# Patient Record
Sex: FEMALE | Race: WHITE | NOT HISPANIC OR LATINO | Employment: OTHER | URBAN - METROPOLITAN AREA
[De-identification: names, ages, dates, MRNs, and addresses within clinical notes are randomized per-mention and may not be internally consistent; named-entity substitution may affect disease eponyms.]

---

## 2018-01-24 ENCOUNTER — HOSPITAL ENCOUNTER (OUTPATIENT)
Dept: MAMMOGRAPHY | Facility: CLINIC | Age: 63
Discharge: HOME | End: 2018-01-24
Attending: FAMILY MEDICINE | Admitting: FAMILY MEDICINE
Payer: COMMERCIAL

## 2018-01-24 DIAGNOSIS — Z12.31 VISIT FOR SCREENING MAMMOGRAM: ICD-10-CM

## 2018-01-24 PROCEDURE — 77067 SCR MAMMO BI INCL CAD: CPT

## 2019-02-01 ENCOUNTER — HOSPITAL ENCOUNTER (OUTPATIENT)
Dept: MAMMOGRAPHY | Facility: CLINIC | Age: 64
Discharge: HOME | End: 2019-02-01
Attending: FAMILY MEDICINE | Admitting: FAMILY MEDICINE
Payer: COMMERCIAL

## 2019-02-01 DIAGNOSIS — Z12.31 VISIT FOR SCREENING MAMMOGRAM: ICD-10-CM

## 2019-02-01 PROCEDURE — 77067 SCR MAMMO BI INCL CAD: CPT

## 2020-02-05 ENCOUNTER — HOSPITAL ENCOUNTER (OUTPATIENT)
Dept: MAMMOGRAPHY | Facility: CLINIC | Age: 65
Discharge: HOME | End: 2020-02-05
Attending: FAMILY MEDICINE | Admitting: FAMILY MEDICINE
Payer: COMMERCIAL

## 2020-02-05 DIAGNOSIS — Z12.31 VISIT FOR SCREENING MAMMOGRAM: ICD-10-CM

## 2020-02-05 PROCEDURE — 77067 SCR MAMMO BI INCL CAD: CPT

## 2020-09-18 DIAGNOSIS — Z11.59 ENCOUNTER FOR SCREENING FOR OTHER VIRAL DISEASES: Primary | ICD-10-CM

## 2020-10-22 DIAGNOSIS — Z11.59 ENCOUNTER FOR SCREENING FOR OTHER VIRAL DISEASES: ICD-10-CM

## 2020-10-22 PROCEDURE — U0003 INFECTIOUS AGENT DETECTION BY NUCLEIC ACID (DNA OR RNA); SEVERE ACUTE RESPIRATORY SYNDROME CORONAVIRUS 2 (SARS-COV-2) (CORONAVIRUS DISEASE [COVID-19]), AMPLIFIED PROBE TECHNIQUE, MAKING USE OF HIGH THROUGHPUT TECHNOLOGIES AS DESCRIBED BY CMS-2020-01-R: HCPCS | Performed by: COLON & RECTAL SURGERY

## 2020-10-23 LAB
SARS-COV-2 RNA SPEC QL NAA+PROBE: NOT DETECTED
SPECIMEN SOURCE: NORMAL

## 2020-10-26 ENCOUNTER — HOSPITAL ENCOUNTER (OUTPATIENT)
Facility: CLINIC | Age: 65
Discharge: HOME | End: 2020-10-26
Attending: COLON & RECTAL SURGERY | Admitting: COLON & RECTAL SURGERY
Payer: COMMERCIAL

## 2020-10-26 VITALS
WEIGHT: 140 LBS | SYSTOLIC BLOOD PRESSURE: 133 MMHG | OXYGEN SATURATION: 99 % | HEART RATE: 61 BPM | TEMPERATURE: 98.3 F | BODY MASS INDEX: 23.9 KG/M2 | DIASTOLIC BLOOD PRESSURE: 81 MMHG | RESPIRATION RATE: 15 BRPM | HEIGHT: 64 IN

## 2020-10-26 LAB — COLONOSCOPY: NORMAL

## 2020-10-26 PROCEDURE — G0500 MOD SEDAT ENDO SERVICE >5YRS: HCPCS | Performed by: COLON & RECTAL SURGERY

## 2020-10-26 PROCEDURE — 45385 COLONOSCOPY W/LESION REMOVAL: CPT | Performed by: COLON & RECTAL SURGERY

## 2020-10-26 PROCEDURE — 272N000105 HC DEVICE CLIP QUICK: Performed by: COLON & RECTAL SURGERY

## 2020-10-26 PROCEDURE — 88305 TISSUE EXAM BY PATHOLOGIST: CPT | Mod: 26 | Performed by: PATHOLOGY

## 2020-10-26 PROCEDURE — 88305 TISSUE EXAM BY PATHOLOGIST: CPT | Mod: TC | Performed by: COLON & RECTAL SURGERY

## 2020-10-26 PROCEDURE — 45381 COLONOSCOPY SUBMUCOUS NJX: CPT | Mod: PT | Performed by: COLON & RECTAL SURGERY

## 2020-10-26 PROCEDURE — 99153 MOD SED SAME PHYS/QHP EA: CPT | Performed by: COLON & RECTAL SURGERY

## 2020-10-26 PROCEDURE — 250N000011 HC RX IP 250 OP 636: Performed by: COLON & RECTAL SURGERY

## 2020-10-26 RX ORDER — SIMVASTATIN 40 MG
40 TABLET ORAL EVERY MORNING
COMMUNITY
Start: 2020-09-09 | End: 2024-06-14

## 2020-10-26 RX ORDER — FEXOFENADINE HCL 180 MG/1
180 TABLET ORAL EVERY MORNING
COMMUNITY
End: 2023-09-15

## 2020-10-26 RX ORDER — NALOXONE HYDROCHLORIDE 0.4 MG/ML
.1-.4 INJECTION, SOLUTION INTRAMUSCULAR; INTRAVENOUS; SUBCUTANEOUS
Status: CANCELLED | OUTPATIENT
Start: 2020-10-26 | End: 2020-10-27

## 2020-10-26 RX ORDER — ONDANSETRON 2 MG/ML
4 INJECTION INTRAMUSCULAR; INTRAVENOUS
Status: DISCONTINUED | OUTPATIENT
Start: 2020-10-26 | End: 2020-10-26 | Stop reason: HOSPADM

## 2020-10-26 RX ORDER — PROCHLORPERAZINE MALEATE 5 MG
5 TABLET ORAL EVERY 6 HOURS PRN
Status: CANCELLED | OUTPATIENT
Start: 2020-10-26

## 2020-10-26 RX ORDER — ONDANSETRON 2 MG/ML
4 INJECTION INTRAMUSCULAR; INTRAVENOUS EVERY 6 HOURS PRN
Status: CANCELLED | OUTPATIENT
Start: 2020-10-26

## 2020-10-26 RX ORDER — FLUMAZENIL 0.1 MG/ML
0.2 INJECTION, SOLUTION INTRAVENOUS
Status: CANCELLED | OUTPATIENT
Start: 2020-10-26 | End: 2020-10-26

## 2020-10-26 RX ORDER — DIPHENHYDRAMINE HCL 25 MG
25 CAPSULE ORAL EVERY 4 HOURS PRN
Status: CANCELLED | OUTPATIENT
Start: 2020-10-26

## 2020-10-26 RX ORDER — DIPHENHYDRAMINE HYDROCHLORIDE 50 MG/ML
25 INJECTION INTRAMUSCULAR; INTRAVENOUS EVERY 4 HOURS PRN
Status: CANCELLED | OUTPATIENT
Start: 2020-10-26

## 2020-10-26 RX ORDER — ONDANSETRON 4 MG/1
4 TABLET, ORALLY DISINTEGRATING ORAL EVERY 6 HOURS PRN
Status: CANCELLED | OUTPATIENT
Start: 2020-10-26

## 2020-10-26 RX ORDER — FENTANYL CITRATE 50 UG/ML
INJECTION, SOLUTION INTRAMUSCULAR; INTRAVENOUS PRN
Status: DISCONTINUED | OUTPATIENT
Start: 2020-10-26 | End: 2020-10-26 | Stop reason: HOSPADM

## 2020-10-26 RX ORDER — LIDOCAINE 40 MG/G
CREAM TOPICAL
Status: DISCONTINUED | OUTPATIENT
Start: 2020-10-26 | End: 2020-10-26 | Stop reason: HOSPADM

## 2020-10-26 ASSESSMENT — MIFFLIN-ST. JEOR: SCORE: 1165.04

## 2020-10-26 NOTE — OP NOTE
See Provation Note In Chart    Mary Morse MD  [facility]  Office Phone # [phone]

## 2020-10-26 NOTE — H&P
"Pre-Endoscopy History and Physical     Layla Lazaro MRN# [id number]   YOB: 1955 Age: 65 year old     Date of Procedure: 10/26/2020  Primary care provider: Yecenia Ricks  Type of Endoscopy: Colonoscopy  Reason for Procedure: Screening  Type of Anesthesia Anticipated: Moderate Sedation    HPI:    Layla is a 65 year old female who will be undergoing the above procedure.      A history and physical has been performed. The patient's medications and allergies have been reviewed. The risks and benefits of the procedure and the sedation options and risks were discussed with the patient.  All questions were answered and informed consent was obtained.      She denies a personal or family history of anesthesia complications or bleeding disorders.     No Known Allergies     No current facility-administered medications on file prior to encounter.        Cyanocobalamin (VITAMIN B 12) 100 MCG LOZG,        fexofenadine (ALLEGRA) 60 MG tablet, Take 60 mg by mouth 2 times daily       simvastatin (ZOCOR) 40 MG tablet,         There is no problem list on file for this patient.       Past Medical History:   Diagnosis Date     Colon polyps      Hemochromatosis     gives blood every 3 mo        Past Surgical History:   Procedure Laterality Date     EYE SURGERY      as child       Social History     Tobacco Use     Smoking status: Former Smoker     Smokeless tobacco: Never Used   Substance Use Topics     Alcohol use: Yes     Comment: 5 per wk       Family History   Problem Relation Age of Onset     Colon Cancer Father        REVIEW OF SYSTEMS:     5 point ROS negative except as noted above in HPI, including Gen., Resp., CV, GI &  system review.      PHYSICAL EXAM:   /81   Pulse 73   Temp 98.3  F (36.8  C) (Oral)   Ht 1.626 m (5' 4\")   Wt 63.5 kg (140 lb)   SpO2 99%   BMI 24.03 kg/m   Estimated body mass index is 24.03 kg/m  as calculated from the following:    Height as of this encounter: 1.626 m " "(5' 4\").    Weight as of this encounter: 63.5 kg (140 lb).   GENERAL APPEARANCE: healthy and alert  MENTAL STATUS: alert  AIRWAY EXAM: Mallampatti Class I (visualization of the soft palate, fauces, uvula, anterior and posterior pillars)  RESP: lungs clear to auscultation - no rales, rhonchi or wheezes  CV: regular rates and rhythm      IMPRESSION   ASA Class 2 - Mild systemic disease        PLAN:     Plan for colonoscopy. We discussed the risks, benefits and alternatives and the patient wished to proceed.    The above has been forwarded to the consulting provider.      Mary Morse MD  [facility]  Phone: [phone]  Fax: [phone]  October 26, 2020    "

## 2020-10-27 LAB — COPATH REPORT: NORMAL

## 2021-02-16 ENCOUNTER — HOSPITAL ENCOUNTER (OUTPATIENT)
Dept: MAMMOGRAPHY | Facility: CLINIC | Age: 66
Discharge: HOME | End: 2021-02-16
Attending: FAMILY MEDICINE | Admitting: FAMILY MEDICINE
Payer: COMMERCIAL

## 2021-02-16 DIAGNOSIS — Z12.31 VISIT FOR SCREENING MAMMOGRAM: ICD-10-CM

## 2021-02-16 PROCEDURE — 77063 BREAST TOMOSYNTHESIS BI: CPT

## 2021-03-21 ENCOUNTER — HEALTH MAINTENANCE LETTER (OUTPATIENT)
Age: 66
End: 2021-03-21

## 2021-09-04 ENCOUNTER — HEALTH MAINTENANCE LETTER (OUTPATIENT)
Age: 66
End: 2021-09-04

## 2022-03-10 ENCOUNTER — HOSPITAL ENCOUNTER (OUTPATIENT)
Dept: MAMMOGRAPHY | Facility: CLINIC | Age: 67
Discharge: HOME | End: 2022-03-10
Attending: FAMILY MEDICINE | Admitting: FAMILY MEDICINE
Payer: COMMERCIAL

## 2022-03-10 DIAGNOSIS — Z12.31 VISIT FOR SCREENING MAMMOGRAM: ICD-10-CM

## 2022-03-10 PROCEDURE — 77067 SCR MAMMO BI INCL CAD: CPT

## 2022-03-24 ENCOUNTER — TRANSFERRED RECORDS (OUTPATIENT)
Dept: HEALTH INFORMATION MANAGEMENT | Facility: CLINIC | Age: 67
End: 2022-03-24

## 2022-03-24 DIAGNOSIS — R01.1 HEART MURMUR: Primary | ICD-10-CM

## 2022-03-24 LAB
ALT SERPL-CCNC: 12 U/L (ref 6–29)
AST SERPL-CCNC: 12 U/L (ref 10–35)
CHOLESTEROL (EXTERNAL): 163 MG/DL
CREATININE (EXTERNAL): 0.55 MG/DL (ref 0.5–0.99)
GFR ESTIMATED (EXTERNAL): 97 ML/MIN/1.73M2
GFR ESTIMATED (IF AFRICAN AMERICAN) (EXTERNAL): 112 ML/MIN/1.73M2
GLUCOSE (EXTERNAL): 92 MG/DL (ref 65–99)
HDLC SERPL-MCNC: 73 MG/DL
LDL CHOLESTEROL CALCULATED (EXTERNAL): 76 MG/DL
NON HDL CHOLESTEROL (EXTERNAL): 90 MG/DL
POTASSIUM (EXTERNAL): 4.2 MMOL/L (ref 3.5–5.3)
TRIGLYCERIDES (EXTERNAL): 59 MG/DL
TSH SERPL-ACNC: 2.34 MIU/L (ref 0.4–4.5)

## 2022-04-06 ENCOUNTER — TRANSFERRED RECORDS (OUTPATIENT)
Dept: HEALTH INFORMATION MANAGEMENT | Facility: CLINIC | Age: 67
End: 2022-04-06

## 2022-04-16 ENCOUNTER — HEALTH MAINTENANCE LETTER (OUTPATIENT)
Age: 67
End: 2022-04-16

## 2022-05-23 ENCOUNTER — HOSPITAL ENCOUNTER (OUTPATIENT)
Dept: CARDIOLOGY | Facility: CLINIC | Age: 67
Discharge: HOME | End: 2022-05-23
Attending: FAMILY MEDICINE | Admitting: FAMILY MEDICINE
Payer: COMMERCIAL

## 2022-05-23 DIAGNOSIS — R01.1 HEART MURMUR: ICD-10-CM

## 2022-05-23 LAB — LVEF ECHO: NORMAL

## 2022-05-23 PROCEDURE — 93306 TTE W/DOPPLER COMPLETE: CPT

## 2022-05-23 PROCEDURE — 93306 TTE W/DOPPLER COMPLETE: CPT | Mod: 26 | Performed by: INTERNAL MEDICINE

## 2022-07-12 ENCOUNTER — TRANSFERRED RECORDS (OUTPATIENT)
Dept: HEALTH INFORMATION MANAGEMENT | Facility: CLINIC | Age: 67
End: 2022-07-12

## 2022-10-22 ENCOUNTER — HEALTH MAINTENANCE LETTER (OUTPATIENT)
Age: 67
End: 2022-10-22

## 2022-11-17 ENCOUNTER — TELEPHONE (OUTPATIENT)
Dept: ORTHOPEDICS | Facility: CLINIC | Age: 67
End: 2022-11-17

## 2022-11-17 NOTE — TELEPHONE ENCOUNTER
Medical Records received from [facility].   Patient is scheduled for office visit on 12/9/22 with Dr. Torrez.     Medical records placed in provider bin for resource at upcoming appointment.   Records will be sent to scan once visit is complete.     Sherlyn Tatum, ATC

## 2022-11-29 ENCOUNTER — TRANSFERRED RECORDS (OUTPATIENT)
Dept: HEALTH INFORMATION MANAGEMENT | Facility: CLINIC | Age: 67
End: 2022-11-29

## 2022-12-03 ASSESSMENT — KOOS JR
STANDING UPRIGHT: MODERATE
RISING FROM SITTING: SEVERE
KOOS JR SCORING: 36.93
GOING UP OR DOWN STAIRS: SEVERE
HOW SEVERE IS YOUR KNEE STIFFNESS AFTER FIRST WAKING IN MORNING: EXTREME
BENDING TO THE FLOOR TO PICK UP OBJECT: MILD
TWISING OR PIVOTING ON KNEE: EXTREME
STRAIGHTENING KNEE FULLY: SEVERE

## 2022-12-09 ENCOUNTER — ANCILLARY PROCEDURE (OUTPATIENT)
Dept: GENERAL RADIOLOGY | Facility: CLINIC | Age: 67
End: 2022-12-09
Attending: STUDENT IN AN ORGANIZED HEALTH CARE EDUCATION/TRAINING PROGRAM
Payer: COMMERCIAL

## 2022-12-09 ENCOUNTER — OFFICE VISIT (OUTPATIENT)
Dept: ORTHOPEDICS | Facility: CLINIC | Age: 67
End: 2022-12-09
Payer: COMMERCIAL

## 2022-12-09 VITALS
BODY MASS INDEX: 26.05 KG/M2 | HEIGHT: 64 IN | DIASTOLIC BLOOD PRESSURE: 76 MMHG | WEIGHT: 152.6 LBS | SYSTOLIC BLOOD PRESSURE: 114 MMHG

## 2022-12-09 DIAGNOSIS — G89.29 CHRONIC PAIN OF BOTH KNEES: Primary | ICD-10-CM

## 2022-12-09 DIAGNOSIS — M25.562 CHRONIC PAIN OF BOTH KNEES: Primary | ICD-10-CM

## 2022-12-09 DIAGNOSIS — M25.561 BILATERAL KNEE PAIN: ICD-10-CM

## 2022-12-09 DIAGNOSIS — M25.562 BILATERAL KNEE PAIN: ICD-10-CM

## 2022-12-09 DIAGNOSIS — M17.11 OSTEOARTHROSIS, LOCALIZED, PRIMARY, KNEE, RIGHT: ICD-10-CM

## 2022-12-09 DIAGNOSIS — M25.561 CHRONIC PAIN OF BOTH KNEES: Primary | ICD-10-CM

## 2022-12-09 PROCEDURE — 99204 OFFICE O/P NEW MOD 45 MIN: CPT | Performed by: STUDENT IN AN ORGANIZED HEALTH CARE EDUCATION/TRAINING PROGRAM

## 2022-12-09 PROCEDURE — 73562 X-RAY EXAM OF KNEE 3: CPT | Mod: TC | Performed by: RADIOLOGY

## 2022-12-09 PROCEDURE — 73562 X-RAY EXAM OF KNEE 3: CPT | Mod: TC | Performed by: STUDENT IN AN ORGANIZED HEALTH CARE EDUCATION/TRAINING PROGRAM

## 2022-12-09 PROCEDURE — 77073 BONE LENGTH STUDIES: CPT | Mod: TC | Performed by: RADIOLOGY

## 2022-12-09 RX ORDER — TRANEXAMIC ACID 650 MG/1
1950 TABLET ORAL ONCE
Status: CANCELLED | OUTPATIENT
Start: 2022-12-09 | End: 2022-12-09

## 2022-12-09 NOTE — PROGRESS NOTES
"    [facility]  Orthopaedic Surgery Consultation by Jose Juan Torrez M.D.    Layla Lazaro MRN# [id number]   Age: 67 year old YOB: 1955     Requesting physician: Yecenia Marquez     Background history:  DX:  1. Hemochromatosis    TREATMENTS:  1. None           History of Present Illness:   67 year old female who presents her clinic because of chronic right knee pain.  This pain has been present for many years.  No clear antecedent trauma but patient did suffer from a fall approximately 7 years ago.  Pain has been progressive since.  Is felt mainly on the medial side of the knee.  The knee swells.  She describes the presence of crepitus.  No giving way or instability.  Patient endorsed the presence of night pain, initiation stiffness and soreness.  Pain is worse when walking up and down stairs.  She denies the presence of significant lower back or ipsilateral hip/groin pain.  To mitigate the pain patient has tried over-the-counter analgesics, physical therapy and home exercise regimen.  She has tried multiple injections with both cortisone and hyaluronic acid.  The latest injection on 11/29/2022 only provided limited relief for a few days.  Patient has not tried a medial  brace yet.  Patient would like to discuss knee replacement surgery as her current complaints are greatly limiting her quality of life and activity level.    Social:   Occupation: retired  Living situation: lives with spouse  Hobbies / Sports: walking    Smoking: No  Alcohol: Yes  Illicit drug use: No         Physical Exam:     EXAMINATION pertinent findings:   PSYCH: Pleasant, healthy-appearing, alert, oriented x3, cooperative. Normal mood and affect.  VITAL SIGNS: Height 1.626 m (5' 4\"), weight 69.2 kg (152 lb 9.6 oz).  Reviewed nursing intake notes.   Body mass index is 26.19 kg/m .  RESP: non labored breathing   ABD: benign, soft, non-tender, no acute peritoneal findings  SKIN: grossly normal "   LYMPHATIC: grossly normal, no adenopathy, no extremity edema  NEURO: grossly normal , no motor deficits  VASCULAR: satisfactory perfusion of all extremities   MUSCULOSKELETAL:   Alignment: Varus alignment  Gait: Normal gait.    The right hip exhibits a full range of motion.  No pain upon rotations.  Lasegue's test is negative.  No tenderness to palpation over greater trochanteric region.    R knee: -0-0  .  Deep flexion is recognizably painful.  Straight leg raise +. No redness, warmth or skin changes present. Effusion Some. Ligamentously stable in both ML and AP direction.  +1 laxity in ML direction most likely due to substance loss.  Normal PF tracking without crepitus. Rabot -. Apprehension -. Meniscal provocation tests are sensitive.  There is recognizable tenderness to palpation over the medial joint line.     Right LE:   Thigh and leg compartments soft and compressible   +Quad/TA/GSC/FHL/EHL   SILT DP/SP/Sarah/Saph/Tib nerve distributions   Palpable dorsalis pedis pulse          Data:   All laboratory data reviewed  All imaging studies reviewed by me personally.    XR knee right 12/9/2022:  My interpretation: End-stage osteoarthritic change of the medial compartment with complete obliteration of joint space.  Presence of marginal osteophytes, sclerosis and subchondral cysts.  Well-preserved lateral and patellofemoral compartments.         Assessment and Plan:   Assessment:  67-year-old female presenting with chronic right knee pain due to end-stage osteoarthritic changes most notably in medial compartment.  Insufficiently responding to nonsurgical treatment options     Plan:  I had a long discussion with the patient regarding ongoing management options.  Reviewed surgical and nonsurgical treatments.  The non-surgical options include activity modification, pain medication, PT, bracing and injection therapy.  We discussed that to optimize nonsurgical treatment we could attempt a medial  brace.   However, I do not anticipate any significant or long-term relief provided by this.  As for surgery we discussed the options of partial and total knee replacement surgery. We reviewed partial and total knee replacement in detail including the procedure, the implants, the recovery process, and long-term outcomes.  We reviewed that the risks of the surgery include but are not limited to infection, wound problems, stiffness, persistent pain, swelling, clicking, loosening, revision surgery.  We also reviewed less common risks such as neurovascular injury fracture, and other implant-related issues.  We reviewed other medical complications such as a blood clot.  We discussed that the vast majority of cases have a highly successful outcome.  However there is a small subset of patients that do experience complications or problems following the knee replacement and these problems can be very debilitating and painful and sometimes do not improve.   Based on a discussion of the risks and benefits, we believe that the benefits far outweigh the risks at this point and the patient  would like to proceed with partial medial unicondylar knee replacement surgery versus possible total knee replacement surgery.  We will work on scheduling surgery at a time that works well for patient in the next few months taking into account that this cannot be done before March 2023 because of the recent injection..  Patient will contact us if there are any questions or concerns leading up to surgery. Before surgery the patient will attend a joint replacement class and will be seen by the PCP/ anesthesiologist and dentist.    Patient is a candidate for same-day discharge surgery.     More information on joint replacements can be found on : [email or web address]    Thank you for your referral.      Jose Juan Torrez MD, PhD     Adult Reconstruction  [facility] Department of  Orthopaedic Surgery        DATA for DOCUMENTATION:         Past Medical History:   There is no problem list on file for this patient.    Past Medical History:   Diagnosis Date     Colon polyps      Hemochromatosis     gives blood every 3 mo       Also see scanned health assessment forms.       Past Surgical History:     Past Surgical History:   Procedure Laterality Date     EYE SURGERY      as child            Social History:     Social History     Socioeconomic History     Marital status:      Spouse name: Not on file     Number of children: Not on file     Years of education: Not on file     Highest education level: Not on file   Occupational History     Not on file   Tobacco Use     Smoking status: Former     Smokeless tobacco: Never   Substance and Sexual Activity     Alcohol use: Yes     Comment: 5 per wk     Drug use: Never     Sexual activity: Not on file   Other Topics Concern     Parent/sibling w/ CABG, MI or angioplasty before 65F 55M? Not Asked   Social History Narrative     Not on file     Social Determinants of Health     Financial Resource Strain: Not on file   Food Insecurity: Not on file   Transportation Needs: Not on file   Physical Activity: Not on file   Stress: Not on file   Social Connections: Not on file   Intimate Partner Violence: Not on file   Housing Stability: Not on file            Family History:       Family History   Problem Relation Age of Onset     Colon Cancer Father             Medications:     Current Outpatient Medications   Medication Sig     Cyanocobalamin (VITAMIN B 12) 100 MCG LOZG      fexofenadine (ALLEGRA) 60 MG tablet Take 60 mg by mouth 2 times daily     simvastatin (ZOCOR) 40 MG tablet      No current facility-administered medications for this visit.              Review of Systems:   A comprehensive 10 point review of systems (constitutional, ENT, cardiac, peripheral vascular, lymphatic, respiratory, GI, , Musculoskeletal, skin, Neurological) was performed and  found to be negative except as described in this note.     See intake form completed by patient

## 2022-12-09 NOTE — PATIENT INSTRUCTIONS
1. Chronic pain of both knees    2. Osteoarthrosis, localized, primary, knee, right        -Schedule right uni vs total knee arthroplasty surgery  Sharmila Ontiveros Scheduler will contact you to assist with scheduling surgery.   You can contact her directly at [phone].   Prior to your scheduled surgery we advise scheduling with your dentist to obtain clearance for surgery, and to complete any recommended dental work prior.   Your surgery date will need to be at least 3 months from your last cortisone injection on 11/29/22.    For mor information regarding total joint surgery please visit our website:   [email or web address]    FORMS:   If you are needing any forms completed relating to your upcoming procedure, please send them to our office with a completed Release of Information.   Forms will be completed AFTER your procedure. A letter can be sent to your employer prior to surgery to inform them of your anticipated time off.    Please notify our staff if you would like a letter to do so.   Forms can be faxed directly to our clinic at [phone].     DO NOT BRING FORMS ON THE DATE OF SURGERY.     MEDICATION REFILL:   Please allow 3 business days for completion of medication refills for any surgery related prescription.   Medication refills submitted on Friday, may not be addressed until the following Monday.  You may request a refill via [employer], or by calling our Nurse Triage at [phone], option 3.       Call my office with any questions or concerns, [phone].

## 2022-12-09 NOTE — LETTER
12/9/2022         RE: Layla Lazaro  [address] [address]        Dear Colleague,    Thank you for referring your patient, Layla Lazaro, to the [facility] [place]. Please see a copy of my visit note below.        [facility]  Orthopaedic Surgery Consultation by Jose Juan Torrez M.D.    Layla Lazaro MRN# [id number]   Age: 67 year old YOB: 1955     Requesting physician: Yecenia Marquez     Background history:  DX:  1. Hemochromatosis    TREATMENTS:  1. None           History of Present Illness:   67 year old female who presents her clinic because of chronic right knee pain.  This pain has been present for many years.  No clear antecedent trauma but patient did suffer from a fall approximately 7 years ago.  Pain has been progressive since.  Is felt mainly on the medial side of the knee.  The knee swells.  She describes the presence of crepitus.  No giving way or instability.  Patient endorsed the presence of night pain, initiation stiffness and soreness.  Pain is worse when walking up and down stairs.  She denies the presence of significant lower back or ipsilateral hip/groin pain.  To mitigate the pain patient has tried over-the-counter analgesics, physical therapy and home exercise regimen.  She has tried multiple injections with both cortisone and hyaluronic acid.  The latest injection on 11/29/2022 only provided limited relief for a few days.  Patient has not tried a medial  brace yet.  Patient would like to discuss knee replacement surgery as her current complaints are greatly limiting her quality of life and activity level.    Social:   Occupation: retired  Living situation: lives with spouse  Hobbies / Sports: walking    Smoking: No  Alcohol: Yes  Illicit drug use: No         Physical Exam:     EXAMINATION pertinent findings:   PSYCH: Pleasant, healthy-appearing, alert, oriented x3,  "cooperative. Normal mood and affect.  VITAL SIGNS: Height 1.626 m (5' 4\"), weight 69.2 kg (152 lb 9.6 oz).  Reviewed nursing intake notes.   Body mass index is 26.19 kg/m .  RESP: non labored breathing   ABD: benign, soft, non-tender, no acute peritoneal findings  SKIN: grossly normal   LYMPHATIC: grossly normal, no adenopathy, no extremity edema  NEURO: grossly normal , no motor deficits  VASCULAR: satisfactory perfusion of all extremities   MUSCULOSKELETAL:   Alignment: Varus alignment  Gait: Normal gait.    The right hip exhibits a full range of motion.  No pain upon rotations.  Lasegue's test is negative.  No tenderness to palpation over greater trochanteric region.    R knee: -0-0  .  Deep flexion is recognizably painful.  Straight leg raise +. No redness, warmth or skin changes present. Effusion Some. Ligamentously stable in both ML and AP direction.  +1 laxity in ML direction most likely due to substance loss.  Normal PF tracking without crepitus. Rabot -. Apprehension -. Meniscal provocation tests are sensitive.  There is recognizable tenderness to palpation over the medial joint line.     Right LE:   Thigh and leg compartments soft and compressible   +Quad/TA/GSC/FHL/EHL   SILT DP/SP/Sarah/Saph/Tib nerve distributions   Palpable dorsalis pedis pulse          Data:   All laboratory data reviewed  All imaging studies reviewed by me personally.    XR knee right 12/9/2022:  My interpretation: End-stage osteoarthritic change of the medial compartment with complete obliteration of joint space.  Presence of marginal osteophytes, sclerosis and subchondral cysts.  Well-preserved lateral and patellofemoral compartments.         Assessment and Plan:   Assessment:  67-year-old female presenting with chronic right knee pain due to end-stage osteoarthritic changes most notably in medial compartment.  Insufficiently responding to nonsurgical treatment options     Plan:  I had a long discussion with the patient " regarding ongoing management options.  Reviewed surgical and nonsurgical treatments.  The non-surgical options include activity modification, pain medication, PT, bracing and injection therapy.  We discussed that to optimize nonsurgical treatment we could attempt a medial  brace.  However, I do not anticipate any significant or long-term relief provided by this.  As for surgery we discussed the options of partial and total knee replacement surgery. We reviewed partial and total knee replacement in detail including the procedure, the implants, the recovery process, and long-term outcomes.  We reviewed that the risks of the surgery include but are not limited to infection, wound problems, stiffness, persistent pain, swelling, clicking, loosening, revision surgery.  We also reviewed less common risks such as neurovascular injury fracture, and other implant-related issues.  We reviewed other medical complications such as a blood clot.  We discussed that the vast majority of cases have a highly successful outcome.  However there is a small subset of patients that do experience complications or problems following the knee replacement and these problems can be very debilitating and painful and sometimes do not improve.   Based on a discussion of the risks and benefits, we believe that the benefits far outweigh the risks at this point and the patient  would like to proceed with partial medial unicondylar knee replacement surgery versus possible total knee replacement surgery.  We will work on scheduling surgery at a time that works well for patient in the next few months taking into account that this cannot be done before March 2023 because of the recent injection..  Patient will contact us if there are any questions or concerns leading up to surgery. Before surgery the patient will attend a joint replacement class and will be seen by the PCP/ anesthesiologist and dentist.    Patient is a candidate for same-day  discharge surgery.     More information on joint replacements can be found on : [email or web address]    Thank you for your referral.      Jose Juan Torrez MD, PhD     Adult Reconstruction  [facility] Department of Orthopaedic Surgery        DATA for DOCUMENTATION:         Past Medical History:   There is no problem list on file for this patient.    Past Medical History:   Diagnosis Date     Colon polyps      Hemochromatosis     gives blood every 3 mo       Also see scanned health assessment forms.       Past Surgical History:     Past Surgical History:   Procedure Laterality Date     EYE SURGERY      as child            Social History:     Social History     Socioeconomic History     Marital status:      Spouse name: Not on file     Number of children: Not on file     Years of education: Not on file     Highest education level: Not on file   Occupational History     Not on file   Tobacco Use     Smoking status: Former     Smokeless tobacco: Never   Substance and Sexual Activity     Alcohol use: Yes     Comment: 5 per wk     Drug use: Never     Sexual activity: Not on file   Other Topics Concern     Parent/sibling w/ CABG, MI or angioplasty before 65F 55M? Not Asked   Social History Narrative     Not on file     Social Determinants of Health     Financial Resource Strain: Not on file   Food Insecurity: Not on file   Transportation Needs: Not on file   Physical Activity: Not on file   Stress: Not on file   Social Connections: Not on file   Intimate Partner Violence: Not on file   Housing Stability: Not on file            Family History:       Family History   Problem Relation Age of Onset     Colon Cancer Father             Medications:     Current Outpatient Medications   Medication Sig     Cyanocobalamin (VITAMIN B 12) 100 MCG LOZG      fexofenadine (ALLEGRA) 60 MG tablet Take 60 mg by mouth 2 times daily     simvastatin (ZOCOR)  40 MG tablet      No current facility-administered medications for this visit.              Review of Systems:   A comprehensive 10 point review of systems (constitutional, ENT, cardiac, peripheral vascular, lymphatic, respiratory, GI, , Musculoskeletal, skin, Neurological) was performed and found to be negative except as described in this note.     See intake form completed by patient        Again, thank you for allowing me to participate in the care of your patient.        Sincerely,        Jose Juan Torrez MD

## 2022-12-13 ENCOUNTER — TELEPHONE (OUTPATIENT)
Dept: ORTHOPEDICS | Facility: CLINIC | Age: 67
End: 2022-12-13

## 2022-12-13 DIAGNOSIS — M17.11 OSTEOARTHROSIS, LOCALIZED, PRIMARY, KNEE, RIGHT: Primary | ICD-10-CM

## 2022-12-13 NOTE — TELEPHONE ENCOUNTER
Scheduled surgery    ATC: please send PT orders to [facility]  ph: [phone]  fax: [phone]    ATC/Dr Torrze: Patient donates blood every three months due to hemochromatosis.  Patients wants to know if it's safe to donate blood 3 weeks after surgery?      Type of surgery: fast track right TKA  :   Location of surgery: [facility] OR  Date and time of surgery: 3/14/23  Surgeon: Lore  Pre-Op Appt Date: 2/27/23 with PAC  Post-Op Appt Date: 3/24/23   Packet sent out: Yes  Pre-cert/Authorization completed:  No  Date: 12/13/22      Rama Beavers Surgery Scheduler

## 2022-12-14 NOTE — TELEPHONE ENCOUNTER
Per Dr Torrez, it is safe to donate blood 3 weeks post surgery.  Notified patient.        Rama Beavers, Surgery Scheduler

## 2022-12-14 NOTE — TELEPHONE ENCOUNTER
PT order placed and faxed to [facility] at number provided below.   Closing encounter.     Linda Spain MSA, ATC  Certified Athletic Trainer

## 2022-12-22 NOTE — TELEPHONE ENCOUNTER
FUTURE VISIT INFORMATION      SURGERY INFORMATION:    Date: 3/14/23    Location:  or    Surgeon:  Jose Juan Torrez MD    Anesthesia Type:  choice    Procedure: RIGHT UNICOMPARTMENTAL KNEE ARTHROPLASTY Versus right total knee arthroplasty    Consult: ov 22    RECORDS REQUESTED FROM:       Primary Care Provider: Yecenia Ricks MD    Most recent EKG+ Tracin/28/10    Most recent ECHO: 22

## 2023-02-13 ENCOUNTER — TELEPHONE (OUTPATIENT)
Dept: ORTHOPEDICS | Facility: CLINIC | Age: 68
End: 2023-02-13
Payer: COMMERCIAL

## 2023-02-13 NOTE — TELEPHONE ENCOUNTER
Patient of Dr. Torrez.  Scheduled for right uni knee vs TKA for Tuesday 3/14/23 at [facility].    Patient is requesting a handicap parking placard.  Please advise.     Patient can be reached at [phone].      Rama Beavers, Surgery Scheduler

## 2023-02-14 NOTE — TELEPHONE ENCOUNTER
Per Dr. Torrez, OK to complete handicap parking request x2 months.   Provider portion of form completed.   Writer called and left a voicemail notifying her that the request is complete, OK to stop by clinic to , or return call and form can be placed in out-going mail.    Sherlyn Tatum, ATC

## 2023-02-17 RX ORDER — CHOLECALCIFEROL (VITAMIN D3) 25 MCG
CAPSULE ORAL
COMMUNITY
End: 2023-02-17

## 2023-02-17 RX ORDER — CHOLECALCIFEROL (VITAMIN D3) 1250 MCG
1250 CAPSULE ORAL
COMMUNITY
End: 2023-02-17

## 2023-02-17 NOTE — PROVIDER NOTIFICATION
02/17/23 0906   Living Arrangements   Is your private residence a single family home or apartment? Single family home   Number of Stairs, Within Home, Primary greater than 10 stairs   Stair Railings, Within Home, Primary railings safe and in good condition   Once home, are you able to live on one level? Yes   Which level? Main Level   Bathroom Shower/Tub Tub/Shower unit   Support System   Do you have someone available to stay with you one or two nights once you are home? Yes   Medical Clearance   It is recommended that you call and check with any specialty providers before surgery to see if you need surgical clearance.  Do you see any specialty providers outside of your primary care provider? No   Blood   Known Bleeding Disorder or Coagulopathy No   Does the patient have any Oriental orthodox/cultural preferences related to blood products? No   Education   Has the patient scheduled or completed pre-op total joint education, either in class or online, in the last 12 months? Yes   What day did the patient complete, or plan to complete, pre-op total joint education? 02/17/23   Patient attended total joint pre-op class/received pre-op teaching  online

## 2023-02-27 ENCOUNTER — PRE VISIT (OUTPATIENT)
Dept: SURGERY | Facility: CLINIC | Age: 68
End: 2023-02-27

## 2023-02-27 ENCOUNTER — ANESTHESIA EVENT (OUTPATIENT)
Dept: SURGERY | Facility: CLINIC | Age: 68
End: 2023-02-27

## 2023-02-27 ENCOUNTER — OFFICE VISIT (OUTPATIENT)
Dept: SURGERY | Facility: CLINIC | Age: 68
End: 2023-02-27
Payer: COMMERCIAL

## 2023-02-27 ENCOUNTER — LAB (OUTPATIENT)
Dept: LAB | Facility: CLINIC | Age: 68
End: 2023-02-27
Payer: COMMERCIAL

## 2023-02-27 VITALS
TEMPERATURE: 98 F | RESPIRATION RATE: 16 BRPM | OXYGEN SATURATION: 95 % | WEIGHT: 152.9 LBS | HEIGHT: 64 IN | HEART RATE: 65 BPM | DIASTOLIC BLOOD PRESSURE: 84 MMHG | BODY MASS INDEX: 26.1 KG/M2 | SYSTOLIC BLOOD PRESSURE: 128 MMHG

## 2023-02-27 DIAGNOSIS — Z01.818 PRE-OP EVALUATION: Primary | ICD-10-CM

## 2023-02-27 DIAGNOSIS — Z01.818 PRE-OP EVALUATION: ICD-10-CM

## 2023-02-27 LAB
ANION GAP SERPL CALCULATED.3IONS-SCNC: 10 MMOL/L (ref 7–15)
BUN SERPL-MCNC: 16.7 MG/DL (ref 8–23)
CALCIUM SERPL-MCNC: 9.7 MG/DL (ref 8.8–10.2)
CHLORIDE SERPL-SCNC: 104 MMOL/L (ref 98–107)
CREAT SERPL-MCNC: 0.57 MG/DL (ref 0.51–0.95)
DEPRECATED HCO3 PLAS-SCNC: 25 MMOL/L (ref 22–29)
ERYTHROCYTE [DISTWIDTH] IN BLOOD BY AUTOMATED COUNT: 12 % (ref 10–15)
GFR SERPL CREATININE-BSD FRML MDRD: >90 ML/MIN/1.73M2
GLUCOSE SERPL-MCNC: 89 MG/DL (ref 70–99)
HCT VFR BLD AUTO: 45.6 % (ref 35–47)
HGB BLD-MCNC: 15.5 G/DL (ref 11.7–15.7)
MCH RBC QN AUTO: 31.9 PG (ref 26.5–33)
MCHC RBC AUTO-ENTMCNC: 34 G/DL (ref 31.5–36.5)
MCV RBC AUTO: 94 FL (ref 78–100)
PLATELET # BLD AUTO: 260 10E3/UL (ref 150–450)
POTASSIUM SERPL-SCNC: 3.8 MMOL/L (ref 3.4–5.3)
RBC # BLD AUTO: 4.86 10E6/UL (ref 3.8–5.2)
SODIUM SERPL-SCNC: 139 MMOL/L (ref 136–145)
WBC # BLD AUTO: 7.6 10E3/UL (ref 4–11)

## 2023-02-27 PROCEDURE — 99204 OFFICE O/P NEW MOD 45 MIN: CPT | Performed by: NURSE PRACTITIONER

## 2023-02-27 PROCEDURE — 85027 COMPLETE CBC AUTOMATED: CPT | Performed by: PATHOLOGY

## 2023-02-27 PROCEDURE — 80048 BASIC METABOLIC PNL TOTAL CA: CPT | Performed by: PATHOLOGY

## 2023-02-27 PROCEDURE — 36415 COLL VENOUS BLD VENIPUNCTURE: CPT | Performed by: PATHOLOGY

## 2023-02-27 RX ORDER — CLOBETASOL PROPIONATE 0.5 MG/G
OINTMENT TOPICAL PRN
COMMUNITY

## 2023-02-27 RX ORDER — VITAMIN B COMPLEX
25 TABLET ORAL EVERY MORNING
COMMUNITY

## 2023-02-27 ASSESSMENT — ENCOUNTER SYMPTOMS: ORTHOPNEA: 0

## 2023-02-27 ASSESSMENT — PAIN SCALES - GENERAL: PAINLEVEL: NO PAIN (0)

## 2023-02-27 ASSESSMENT — LIFESTYLE VARIABLES: TOBACCO_USE: 1

## 2023-02-27 NOTE — PATIENT INSTRUCTIONS
Name:  Layla Lazaro   MRN:  [id number]   :  1955   Today's Date:  2023         You were seen today for a pre-operative assessment in the:    Pre-operative Anesthesia Assessment Center(PAC)  [facility]  [address] [place] [id number]  phone [phone]      You will be receiving a call with location, date, arrival time and diet instructions from Preadmission Nursing at your surgical site:    -[facility]: [phone]   -[facility]: [phone]  -[facility]: [phone]  -[facility]: [phone]  -[facility]: [phone]        Anesthesia recommendations for medications:    Hold Aspirin for 7 days before procedure.  Hold Multivitamins for 7 days before procedure.   Hold Herbal medications and Supplements for 7 days before procedure.  Hold Ibuprofen for 1 day before procedure.   Hold Naproxen for 4 days before procedure.       Please DO NOT take the following medications the day of procedure:    Vitamin B12  Vitamin D3    Please take these medications the day of procedure:    Allegra if needed  Simvastatin    For questions or appointments, call:    For further questions regarding your surgery please call your surgeon's office.

## 2023-02-27 NOTE — H&P
Pre-Operative H & P     CC:  Preoperative exam to assess for increased cardiopulmonary risk while undergoing surgery and anesthesia.    Date of Encounter: 2/27/2023  Primary Care Physician:  Yecenia Ricks     Reason for visit: Pre-operative evaluation    HPI  Layla Lazaro is a 68 year old female who presents for pre-operative H & P in preparation for  Procedure Information     Date/Time: 3/14/2023     Procedure: Right unicompartmental knee arthroplasty; versus right total knee arthroplasty    Anesthesia type: Choice    Pre-op diagnosis: Osteoarthritis, localized, primary, knee, right    Location: [facility]    Providers: Jose Juan Torrez MD          Layla Lazaro is a 68 year old female with a PMH significant for Moderate AS, HL, hemochromatosis (donates blood every 3 months) allergic rhinitis and OA. She met with Dr. Torrez regarding her chronic right knee pain that has been present for many years.  Pain has been progressive since.  Her symptoms include knee swelling, crepitus, night pain, stiffness and soreness. Pain is worse with walking up and down stairs. She has tried conservative pain management measure that included over-the-counter analgesics, physical therapy, and home exercise regimen.  She has tried multiple injections with both cortisone and hyaluronic acid.  The latest injection on 11/29/2022 only provided limited relief for a few days. Patient at this point feels that this is limiting her ability to do certain activities and affecting her quality of life. She presents today in preparation for the above recommended procedure.      History is obtained from the patient and chart review    Hx of abnormal bleeding or anti-platelet use: no    Menstrual history: No LMP recorded (lmp unknown). Patient is postmenopausal.:      Past Medical History  Past Medical History:   Diagnosis Date     Colon polyps      Heart murmur      Hemochromatosis     gives blood every 3 mo       Past  Surgical History  Past Surgical History:   Procedure Laterality Date     COLONOSCOPY       EYE SURGERY      as child     MOUTH SURGERY      wisdom teeth age 17       Prior to Admission Medications  Current Outpatient Medications   Medication Sig Dispense Refill     clobetasol (TEMOVATE) 0.05 % external ointment Apply topically as needed       Cyanocobalamin (VITAMIN B 12) 100 MCG LOZG Take 100 mcg by mouth every morning       fexofenadine (ALLEGRA) 180 MG tablet Take 180 mg by mouth every morning       simvastatin (ZOCOR) 40 MG tablet Take 40 mg by mouth every morning       Vitamin D3 (CHOLECALCIFEROL) 25 mcg (1000 units) tablet Take 25 mcg by mouth every morning       vitamin D3 (CHOLECALCIFEROL) 250 mcg (54017 units) capsule Take 4 capsules by mouth every 7 days         Allergies  No Known Allergies    Social History  Social History     Socioeconomic History     Marital status:      Spouse name: Not on file     Number of children: Not on file     Years of education: Not on file     Highest education level: Not on file   Occupational History     Not on file   Tobacco Use     Smoking status: Former     Types: Cigarettes     Quit date:      Years since quittin.     Smokeless tobacco: Never   Substance and Sexual Activity     Alcohol use: Not Currently     Drug use: Never     Sexual activity: Not on file   Other Topics Concern     Parent/sibling w/ CABG, MI or angioplasty before 65F 55M? Not Asked   Social History Narrative     Not on file     Social Determinants of Health     Financial Resource Strain: Not on file   Food Insecurity: Not on file   Transportation Needs: Not on file   Physical Activity: Not on file   Stress: Not on file   Social Connections: Not on file   Intimate Partner Violence: Not on file   Housing Stability: Not on file       Family History  Family History   Problem Relation Age of Onset     Colon Cancer Father        Review of Systems  The complete review of systems is negative  "other than noted in the HPI or here.   Anesthesia Evaluation   Pt has not had prior anesthetic         ROS/MED HX  ENT/Pulmonary:     (+) allergic rhinitis, tobacco use, Past use,     Neurologic:  - neg neurologic ROS     Cardiovascular:     (+) Dyslipidemia -----valvular problems/murmurs type: AS Moderate Mean gradient 20 BOO 1.4cm. Previous cardiac testing   Echo: Date: 5/2022 Results:    Stress Test: Date: Results:    ECG Reviewed: Date: Results:    Cath: Date: Results:   (-) ATWOOD, orthopnea/PND and syncope   METS/Exercise Tolerance: >4 METS Comment: Previously able to walk 3-5 miles due to knee is walking 2 miles daily.    Hematologic: Comments: Hemochromatosis- gives blood Q3 months      Musculoskeletal:   (+) arthritis,     GI/Hepatic:  - neg GI/hepatic ROS     Renal/Genitourinary:  - neg Renal ROS     Endo:  - neg endo ROS     Psychiatric/Substance Use:  - neg psychiatric ROS     Infectious Disease:  - neg infectious disease ROS     Malignancy:  - neg malignancy ROS     Other:            /84 (BP Location: Right arm, Patient Position: Sitting, Cuff Size: Adult Regular)   Pulse 65   Temp 98  F (36.7  C) (Oral)   Resp 16   Ht 1.626 m (5' 4\")   Wt 69.4 kg (152 lb 14.4 oz)   LMP  (LMP Unknown)   SpO2 95%   Breastfeeding No   BMI 26.25 kg/m      Physical Exam   Constitutional: Awake, alert, cooperative, no apparent distress, and appears stated age.  Eyes: Pupils equal, round and reactive to light, extra ocular muscles intact, sclera clear, conjunctiva normal.  HENT: Normocephalic, oral pharynx with moist mucus membranes, good dentition. No goiter appreciated.   Respiratory: Clear to auscultation bilaterally, no crackles or wheezing.  Cardiovascular: Regular rate and rhythm, normal S1 and S2, and 2/6 murmur noted.  Carotids +2, no bruits. No edema. Palpable pulses to radial  DP and PT arteries.   GI: Normal bowel sounds, soft, non-distended, non-tender, no masses palpated, no hepatosplenomegaly.  "   Lymph/Hematologic: No cervical lymphadenopathy and no supraclavicular lymphadenopathy.  Genitourinary:  deferred  Skin: Warm and dry.  No rashes at anticipated surgical site.   Musculoskeletal: Full ROM of neck. There is no redness, warmth, or swelling of the joints. Gross motor strength is normal.    Neurologic: Awake, alert, oriented to name, place and time. Cranial nerves II-XII are grossly intact. Gait is normal.   Neuropsychiatric: Calm, cooperative. Normal affect.     Prior Labs/Diagnostic Studies   All labs and imaging personally reviewed     EKG/ stress test - if available please see in ROS above   5/2022  Echo result w/o MOPS: Interpretation Summary Left ventricular size, global systolic function, and wall motion are normal,estimated LVEF 60-65%.Right ventricular global function is normal.Moderate aortic stenosis, Vmax 3.1 m/s, mean gradient 20 mmHg, BOO 1.4cm2, DI0.46. SVi 54 cc/m2. There are no prior studies available for comparison.      No flowsheet data found.      The patient's records and results personally reviewed by this provider.     Outside records reviewed from: Care Everywhere    LAB/DIAGNOSTIC STUDIES TODAY:    Last Comprehensive Metabolic Panel:  Lab Results   Component Value Date     02/27/2023    POTASSIUM 3.8 02/27/2023    CHLORIDE 104 02/27/2023    CO2 25 02/27/2023    ANIONGAP 10 02/27/2023    GLC 89 02/27/2023    BUN 16.7 02/27/2023    CR 0.57 02/27/2023    GFRESTIMATED >90 02/27/2023    DANYEL 9.7 02/27/2023       Lab Results   Component Value Date    WBC 7.6 02/27/2023    WBC 8.5 12/28/2010     Lab Results   Component Value Date    RBC 4.86 02/27/2023    RBC 4.67 12/28/2010     Lab Results   Component Value Date    HGB 15.5 02/27/2023    HGB 15.5 12/28/2010     Lab Results   Component Value Date    HCT 45.6 02/27/2023    HCT 44.3 12/28/2010     No components found for: MCT  Lab Results   Component Value Date    MCV 94 02/27/2023    MCV 95 12/28/2010     Lab Results   Component  Value Date    MCH 31.9 02/27/2023    MCH 33.2 12/28/2010     Lab Results   Component Value Date    MCHC 34.0 02/27/2023    MCHC 35.0 12/28/2010     Lab Results   Component Value Date    RDW 12.0 02/27/2023    RDW 12.1 12/28/2010     Lab Results   Component Value Date     02/27/2023     12/28/2010         Assessment      Layla Lazaro is a 68 year old female seen as a PAC referral for risk assessment and optimization for anesthesia.    Plan/Recommendations  Pt will be optimized for the proposed procedure.  See below for details on the assessment, risk, and preoperative recommendations    NEUROLOGY  - No history of TIA, CVA or seizure  - Chronic Pain  On chronic opiates, morphine equivilant = None   -Post Op delirium risk factors:  No risk identified    ENT  - No current airway concerns.  Will need to be reassessed day of surgery.  Mallampati: II  TM: > 3    CARDIAC  - No history of CAD, Hypertension and Afib   No anginal symptoms, Denies palpitations, PND, dizziness or syncope.   - Hyperlipidemia  Well controlled on home regimen  - Aortic Stenosis- Moderate mean gradient 20, BOO 1.4cm - asymptomatic ( echo 5/2022)  - 2/6 murmur on exam.   - METS (Metabolic Equivalents) walks 2 miles daily. Able to take stairs in home.   Patient performs 4 or more METS exercise without symptoms            Total Score: 0      RCRI-Very low risk: Class 1 0.4% complication rate            Total Score: 0        PULMONARY    DINA Low Risk            Total Score: 1    DINA: Over 50 ys old      - Denies asthma or inhaler use  - Tobacco History      History   Smoking Status     Former     Types: Cigarettes     Quit date: 1992   Smokeless Tobacco     Never       GI    PONV High Risk  Total Score: 3           1 AN PONV: Pt is Female    1 AN PONV: Patient is not a current smoker    1 AN PONV: Intended Post Op Opioids        /RENAL  - Baseline Creatinine  WNL    ENDOCRINE    - BMI: Estimated body mass index is 26.25 kg/m   "as calculated from the following:    Height as of this encounter: 1.626 m (5' 4\").    Weight as of this encounter: 69.4 kg (152 lb 14.4 oz).  Overweight (BMI 25.0-29.9)  - No history of Diabetes Mellitus    HEME  VTE Low Risk 0.26%            Total Score: 1    VTE: Greater than 59 yrs old      - No history of abnormal bleeding or antiplatelet use.  Hemochromatosis- gives blood Q 3 months    MSK    Osteoarthritis- Right knee. Procedure scheduled as above.     Patient is NOT Frail            Total Score: 0        Different anesthesia methods/types have been discussed with the patient, but they are aware that the final plan will be decided by the assigned anesthesia provider on the date of service.    The patient is optimized for their procedure. AVS with information meds  given by nursing staff. Further details on surgical date and pre-operative instructions will be given by the nursing staff at [facility].  No further diagnostic testing indicated.      On the day of service:     Prep time: 20 minutes  Visit time: 25 minutes  Documentation time: 10 minutes  ------------------------------------------  Total time: 55 minutes      MATTHEW Manning CNP  Preoperative Assessment Center  [facility]  Phone: [phone]  Fax: [phone]  "

## 2023-03-06 NOTE — PROGRESS NOTES
Ortho Navigator Note      Pre-op Date 2/27/23 PAC     Medical Clearance  Cleared     Labs WNR     COVID Test Date No longer indicated      Skin  Intact      Activity: Ambulates independently without assistive device      Equipment Need Patient will likely need a walker for discharge. Defer to PT/OT for recs.       Meds to Hold Held all supplements 14 days prior to surgery    * Medication recommendations are not intended to be exhaustive; they are limited to common medications that are potentially dangerous if incorrectly managed   NPO Instructions  Defer to PAN RN     Pre-op Joint Education Complete? Complete   Discharge Plan Patient has plan to discharge home on morning of DOS as Fast Track.    /Transportation Daughter (RN) will be .  is physically able to care for patient. Patients  has had recent health issues and is limited in his ability to assist.      Barriers to Discharge No barriers to discharge.      Additional Info/   Special Needs : Patient had no unanswered questions or concerns.            02/17/23 0906   Discharge Planning   Patient/Family Anticipates Transition to home with family  (daughter (RN) is coming in to town for 1 week.)   Concerns to be Addressed no discharge needs identified   Living Arrangements   People in Home spouse   Type of Residence Private Residence   Is your private residence a single family home or apartment? Single family home   Number of Stairs, Within Home, Primary greater than 10 stairs   Stair Railings, Within Home, Primary railings safe and in good condition   Once home, are you able to live on one level? Yes   Which level? Main Level   Bathroom Shower/Tub Tub/Shower unit   Equipment Currently Used at Home none   Support System   Do you have someone available to stay with you one or two nights once you are home? Yes   Medical Clearance   It is recommended that you call and check with any specialty providers before surgery to see if you need surgical  clearance.  Do you see any specialty providers outside of your primary care provider? No   Blood   Known Bleeding Disorder or Coagulopathy No   Does the patient have any Shinto/cultural preferences related to blood products? No   Education   Has the patient scheduled or completed pre-op total joint education, either in class or online, in the last 12 months? Yes   What day did the patient complete, or plan to complete, pre-op total joint education? 02/17/23   Patient attended total joint pre-op class/received pre-op teaching  online   Relationship/Environment   Name(s) of People in Home  and daughter

## 2023-03-13 ENCOUNTER — HOSPITAL ENCOUNTER (OUTPATIENT)
Dept: MAMMOGRAPHY | Facility: CLINIC | Age: 68
Discharge: HOME | End: 2023-03-13
Attending: FAMILY MEDICINE | Admitting: FAMILY MEDICINE
Payer: COMMERCIAL

## 2023-03-13 DIAGNOSIS — Z12.31 VISIT FOR SCREENING MAMMOGRAM: ICD-10-CM

## 2023-03-13 PROCEDURE — 77067 SCR MAMMO BI INCL CAD: CPT

## 2023-03-13 NOTE — PHARMACY-ADMISSION MEDICATION HISTORY
Pharmacy reviewed prior to admission med list from pre-admitting [email or web address].        Prior to Admission medications    Medication Sig Last Dose Taking? Auth Provider Long Term End Date   Cyanocobalamin (VITAMIN B 12) 100 MCG LOZG Take 100 mcg by mouth every morning  Yes Reported, Patient     fexofenadine (ALLEGRA) 180 MG tablet Take 180 mg by mouth every morning  Yes Reported, Patient     simvastatin (ZOCOR) 40 MG tablet Take 40 mg by mouth every morning  Yes Reported, Patient Yes    clobetasol (TEMOVATE) 0.05 % external ointment Apply topically as needed   Reported, Patient     Vitamin D3 (CHOLECALCIFEROL) 25 mcg (1000 units) tablet Take 25 mcg by mouth every morning   Reported, Patient

## 2023-03-14 ENCOUNTER — APPOINTMENT (OUTPATIENT)
Dept: GENERAL RADIOLOGY | Facility: CLINIC | Age: 68
End: 2023-03-14
Attending: STUDENT IN AN ORGANIZED HEALTH CARE EDUCATION/TRAINING PROGRAM
Payer: COMMERCIAL

## 2023-03-14 ENCOUNTER — ANESTHESIA (OUTPATIENT)
Dept: SURGERY | Facility: CLINIC | Age: 68
End: 2023-03-14
Payer: COMMERCIAL

## 2023-03-14 ENCOUNTER — ANESTHESIA EVENT (OUTPATIENT)
Dept: SURGERY | Facility: CLINIC | Age: 68
End: 2023-03-14
Payer: COMMERCIAL

## 2023-03-14 ENCOUNTER — HOSPITAL ENCOUNTER (OUTPATIENT)
Facility: CLINIC | Age: 68
Discharge: HOME | End: 2023-03-14
Attending: STUDENT IN AN ORGANIZED HEALTH CARE EDUCATION/TRAINING PROGRAM | Admitting: STUDENT IN AN ORGANIZED HEALTH CARE EDUCATION/TRAINING PROGRAM
Payer: COMMERCIAL

## 2023-03-14 ENCOUNTER — APPOINTMENT (OUTPATIENT)
Dept: PHYSICAL THERAPY | Facility: CLINIC | Age: 68
End: 2023-03-14
Attending: STUDENT IN AN ORGANIZED HEALTH CARE EDUCATION/TRAINING PROGRAM
Payer: COMMERCIAL

## 2023-03-14 VITALS
TEMPERATURE: 97.2 F | HEART RATE: 70 BPM | HEIGHT: 64 IN | DIASTOLIC BLOOD PRESSURE: 70 MMHG | OXYGEN SATURATION: 96 % | SYSTOLIC BLOOD PRESSURE: 128 MMHG | RESPIRATION RATE: 16 BRPM | BODY MASS INDEX: 25.95 KG/M2 | WEIGHT: 152 LBS

## 2023-03-14 DIAGNOSIS — M17.11 OSTEOARTHROSIS, LOCALIZED, PRIMARY, KNEE, RIGHT: ICD-10-CM

## 2023-03-14 PROCEDURE — 710N000009 HC RECOVERY PHASE 1, LEVEL 1, PER MIN: Performed by: STUDENT IN AN ORGANIZED HEALTH CARE EDUCATION/TRAINING PROGRAM

## 2023-03-14 PROCEDURE — C1776 JOINT DEVICE (IMPLANTABLE): HCPCS | Performed by: STUDENT IN AN ORGANIZED HEALTH CARE EDUCATION/TRAINING PROGRAM

## 2023-03-14 PROCEDURE — 97110 THERAPEUTIC EXERCISES: CPT | Mod: GP

## 2023-03-14 PROCEDURE — 250N000011 HC RX IP 250 OP 636: Performed by: NURSE ANESTHETIST, CERTIFIED REGISTERED

## 2023-03-14 PROCEDURE — 97530 THERAPEUTIC ACTIVITIES: CPT | Mod: GP

## 2023-03-14 PROCEDURE — 370N000017 HC ANESTHESIA TECHNICAL FEE, PER MIN: Performed by: STUDENT IN AN ORGANIZED HEALTH CARE EDUCATION/TRAINING PROGRAM

## 2023-03-14 PROCEDURE — 27446 REVISION OF KNEE JOINT: CPT | Mod: RT | Performed by: STUDENT IN AN ORGANIZED HEALTH CARE EDUCATION/TRAINING PROGRAM

## 2023-03-14 PROCEDURE — 250N000011 HC RX IP 250 OP 636: Performed by: ANESTHESIOLOGY

## 2023-03-14 PROCEDURE — 250N000009 HC RX 250: Performed by: NURSE ANESTHETIST, CERTIFIED REGISTERED

## 2023-03-14 PROCEDURE — C1713 ANCHOR/SCREW BN/BN,TIS/BN: HCPCS | Performed by: STUDENT IN AN ORGANIZED HEALTH CARE EDUCATION/TRAINING PROGRAM

## 2023-03-14 PROCEDURE — 999N000065 XR KNEE PORT RIGHT 1/2 VIEWS: Mod: RT

## 2023-03-14 PROCEDURE — 258N000003 HC RX IP 258 OP 636: Performed by: STUDENT IN AN ORGANIZED HEALTH CARE EDUCATION/TRAINING PROGRAM

## 2023-03-14 PROCEDURE — 360N000077 HC SURGERY LEVEL 4, PER MIN: Performed by: STUDENT IN AN ORGANIZED HEALTH CARE EDUCATION/TRAINING PROGRAM

## 2023-03-14 PROCEDURE — 250N000025 HC SEVOFLURANE, PER MIN: Performed by: STUDENT IN AN ORGANIZED HEALTH CARE EDUCATION/TRAINING PROGRAM

## 2023-03-14 PROCEDURE — 250N000011 HC RX IP 250 OP 636: Performed by: STUDENT IN AN ORGANIZED HEALTH CARE EDUCATION/TRAINING PROGRAM

## 2023-03-14 PROCEDURE — 97116 GAIT TRAINING THERAPY: CPT | Mod: GP

## 2023-03-14 PROCEDURE — 250N000013 HC RX MED GY IP 250 OP 250 PS 637: Performed by: STUDENT IN AN ORGANIZED HEALTH CARE EDUCATION/TRAINING PROGRAM

## 2023-03-14 PROCEDURE — 258N000003 HC RX IP 258 OP 636: Performed by: ANESTHESIOLOGY

## 2023-03-14 PROCEDURE — 97161 PT EVAL LOW COMPLEX 20 MIN: CPT | Mod: GP

## 2023-03-14 PROCEDURE — 272N000001 HC OR GENERAL SUPPLY STERILE: Performed by: STUDENT IN AN ORGANIZED HEALTH CARE EDUCATION/TRAINING PROGRAM

## 2023-03-14 PROCEDURE — 258N000001 HC RX 258: Performed by: STUDENT IN AN ORGANIZED HEALTH CARE EDUCATION/TRAINING PROGRAM

## 2023-03-14 PROCEDURE — 999N000141 HC STATISTIC PRE-PROCEDURE NURSING ASSESSMENT: Performed by: STUDENT IN AN ORGANIZED HEALTH CARE EDUCATION/TRAINING PROGRAM

## 2023-03-14 PROCEDURE — 250N000009 HC RX 250: Performed by: STUDENT IN AN ORGANIZED HEALTH CARE EDUCATION/TRAINING PROGRAM

## 2023-03-14 DEVICE — BONE CEMENT SIMPLEX FULL DOSE 6191-1-001: Type: IMPLANTABLE DEVICE | Site: KNEE | Status: FUNCTIONAL

## 2023-03-14 DEVICE — IMPLANTABLE DEVICE: Type: IMPLANTABLE DEVICE | Site: KNEE | Status: FUNCTIONAL

## 2023-03-14 RX ORDER — CEFAZOLIN SODIUM/WATER 2 G/20 ML
2 SYRINGE (ML) INTRAVENOUS SEE ADMIN INSTRUCTIONS
Status: DISCONTINUED | OUTPATIENT
Start: 2023-03-14 | End: 2023-03-14 | Stop reason: HOSPADM

## 2023-03-14 RX ORDER — LIDOCAINE 40 MG/G
CREAM TOPICAL
Status: DISCONTINUED | OUTPATIENT
Start: 2023-03-14 | End: 2023-03-14 | Stop reason: HOSPADM

## 2023-03-14 RX ORDER — METHADONE HYDROCHLORIDE 10 MG/ML
INJECTION, SOLUTION INTRAMUSCULAR; INTRAVENOUS; SUBCUTANEOUS PRN
Status: DISCONTINUED | OUTPATIENT
Start: 2023-03-14 | End: 2023-03-14

## 2023-03-14 RX ORDER — ONDANSETRON 2 MG/ML
4 INJECTION INTRAMUSCULAR; INTRAVENOUS EVERY 30 MIN PRN
Status: DISCONTINUED | OUTPATIENT
Start: 2023-03-14 | End: 2023-03-14 | Stop reason: HOSPADM

## 2023-03-14 RX ORDER — IBUPROFEN 600 MG/1
600 TABLET, FILM COATED ORAL EVERY 6 HOURS PRN
Qty: 30 TABLET | Refills: 0 | Status: SHIPPED | OUTPATIENT
Start: 2023-03-14 | End: 2023-08-21

## 2023-03-14 RX ORDER — KETOROLAC TROMETHAMINE 15 MG/ML
15 INJECTION, SOLUTION INTRAMUSCULAR; INTRAVENOUS EVERY 6 HOURS
Status: DISCONTINUED | OUTPATIENT
Start: 2023-03-14 | End: 2023-03-14 | Stop reason: HOSPADM

## 2023-03-14 RX ORDER — POLYETHYLENE GLYCOL 3350 17 G/17G
17 POWDER, FOR SOLUTION ORAL DAILY
Status: DISCONTINUED | OUTPATIENT
Start: 2023-03-15 | End: 2023-03-14 | Stop reason: HOSPADM

## 2023-03-14 RX ORDER — BISACODYL 10 MG
10 SUPPOSITORY, RECTAL RECTAL DAILY PRN
Status: DISCONTINUED | OUTPATIENT
Start: 2023-03-14 | End: 2023-03-14 | Stop reason: HOSPADM

## 2023-03-14 RX ORDER — AMOXICILLIN 250 MG
1-2 CAPSULE ORAL 2 TIMES DAILY
Qty: 30 TABLET | Refills: 0 | Status: SHIPPED | OUTPATIENT
Start: 2023-03-14 | End: 2023-08-21

## 2023-03-14 RX ORDER — SODIUM CHLORIDE, SODIUM LACTATE, POTASSIUM CHLORIDE, CALCIUM CHLORIDE 600; 310; 30; 20 MG/100ML; MG/100ML; MG/100ML; MG/100ML
INJECTION, SOLUTION INTRAVENOUS CONTINUOUS
Status: DISCONTINUED | OUTPATIENT
Start: 2023-03-14 | End: 2023-03-14 | Stop reason: HOSPADM

## 2023-03-14 RX ORDER — ACETAMINOPHEN 325 MG/1
650 TABLET ORAL EVERY 4 HOURS PRN
Status: DISCONTINUED | OUTPATIENT
Start: 2023-03-17 | End: 2023-03-14 | Stop reason: HOSPADM

## 2023-03-14 RX ORDER — OXYCODONE HYDROCHLORIDE 10 MG/1
10 TABLET ORAL EVERY 4 HOURS PRN
Status: DISCONTINUED | OUTPATIENT
Start: 2023-03-14 | End: 2023-03-14 | Stop reason: HOSPADM

## 2023-03-14 RX ORDER — PROCHLORPERAZINE MALEATE 5 MG
5 TABLET ORAL EVERY 6 HOURS PRN
Status: DISCONTINUED | OUTPATIENT
Start: 2023-03-14 | End: 2023-03-14 | Stop reason: HOSPADM

## 2023-03-14 RX ORDER — OXYCODONE HYDROCHLORIDE 5 MG/1
5 TABLET ORAL EVERY 4 HOURS PRN
Status: DISCONTINUED | OUTPATIENT
Start: 2023-03-14 | End: 2023-03-14 | Stop reason: HOSPADM

## 2023-03-14 RX ORDER — FENTANYL CITRATE 50 UG/ML
25 INJECTION, SOLUTION INTRAMUSCULAR; INTRAVENOUS EVERY 5 MIN PRN
Status: DISCONTINUED | OUTPATIENT
Start: 2023-03-14 | End: 2023-03-14 | Stop reason: HOSPADM

## 2023-03-14 RX ORDER — NALOXONE HYDROCHLORIDE 0.4 MG/ML
0.2 INJECTION, SOLUTION INTRAMUSCULAR; INTRAVENOUS; SUBCUTANEOUS
Status: DISCONTINUED | OUTPATIENT
Start: 2023-03-14 | End: 2023-03-14 | Stop reason: HOSPADM

## 2023-03-14 RX ORDER — METHADONE HYDROCHLORIDE 10 MG/ML
2 INJECTION, SOLUTION INTRAMUSCULAR; INTRAVENOUS; SUBCUTANEOUS 3 TIMES DAILY PRN
Status: DISCONTINUED | OUTPATIENT
Start: 2023-03-14 | End: 2023-03-14 | Stop reason: HOSPADM

## 2023-03-14 RX ORDER — ONDANSETRON 2 MG/ML
INJECTION INTRAMUSCULAR; INTRAVENOUS PRN
Status: DISCONTINUED | OUTPATIENT
Start: 2023-03-14 | End: 2023-03-14

## 2023-03-14 RX ORDER — OXYCODONE HYDROCHLORIDE 5 MG/1
5-10 TABLET ORAL EVERY 4 HOURS PRN
Qty: 26 TABLET | Refills: 0 | Status: SHIPPED | OUTPATIENT
Start: 2023-03-14 | End: 2023-08-21

## 2023-03-14 RX ORDER — MECLIZINE HYDROCHLORIDE 25 MG/1
25 TABLET ORAL ONCE
Status: DISCONTINUED | OUTPATIENT
Start: 2023-03-14 | End: 2023-03-14 | Stop reason: HOSPADM

## 2023-03-14 RX ORDER — NALOXONE HYDROCHLORIDE 0.4 MG/ML
0.4 INJECTION, SOLUTION INTRAMUSCULAR; INTRAVENOUS; SUBCUTANEOUS
Status: DISCONTINUED | OUTPATIENT
Start: 2023-03-14 | End: 2023-03-14 | Stop reason: HOSPADM

## 2023-03-14 RX ORDER — POLYETHYLENE GLYCOL 3350 17 G/17G
1 POWDER, FOR SOLUTION ORAL DAILY
Qty: 7 PACKET | Refills: 0 | Status: SHIPPED | OUTPATIENT
Start: 2023-03-14 | End: 2023-08-21

## 2023-03-14 RX ORDER — ACETAMINOPHEN 325 MG/1
650 TABLET ORAL EVERY 4 HOURS PRN
Qty: 100 TABLET | Refills: 0 | Status: SHIPPED | OUTPATIENT
Start: 2023-03-14 | End: 2023-08-21

## 2023-03-14 RX ORDER — DEXAMETHASONE SODIUM PHOSPHATE 4 MG/ML
INJECTION, SOLUTION INTRA-ARTICULAR; INTRALESIONAL; INTRAMUSCULAR; INTRAVENOUS; SOFT TISSUE PRN
Status: DISCONTINUED | OUTPATIENT
Start: 2023-03-14 | End: 2023-03-14

## 2023-03-14 RX ORDER — HYDROMORPHONE HCL IN WATER/PF 6 MG/30 ML
0.4 PATIENT CONTROLLED ANALGESIA SYRINGE INTRAVENOUS EVERY 5 MIN PRN
Status: CANCELLED | OUTPATIENT
Start: 2023-03-14

## 2023-03-14 RX ORDER — CEFAZOLIN SODIUM 1 G/3ML
1 INJECTION, POWDER, FOR SOLUTION INTRAMUSCULAR; INTRAVENOUS EVERY 8 HOURS
Status: DISCONTINUED | OUTPATIENT
Start: 2023-03-14 | End: 2023-03-14 | Stop reason: HOSPADM

## 2023-03-14 RX ORDER — TRANEXAMIC ACID 650 MG/1
1950 TABLET ORAL ONCE
Status: COMPLETED | OUTPATIENT
Start: 2023-03-14 | End: 2023-03-14

## 2023-03-14 RX ORDER — ACETAMINOPHEN 500 MG
500 TABLET ORAL 2 TIMES DAILY PRN
COMMUNITY
End: 2024-06-14

## 2023-03-14 RX ORDER — FENTANYL CITRATE 50 UG/ML
50 INJECTION, SOLUTION INTRAMUSCULAR; INTRAVENOUS EVERY 5 MIN PRN
Status: CANCELLED | OUTPATIENT
Start: 2023-03-14

## 2023-03-14 RX ORDER — ACETAMINOPHEN 325 MG/1
975 TABLET ORAL EVERY 8 HOURS
Status: DISCONTINUED | OUTPATIENT
Start: 2023-03-14 | End: 2023-03-14 | Stop reason: HOSPADM

## 2023-03-14 RX ORDER — ACETAMINOPHEN 325 MG/1
975 TABLET ORAL ONCE
Status: DISCONTINUED | OUTPATIENT
Start: 2023-03-14 | End: 2023-03-14 | Stop reason: HOSPADM

## 2023-03-14 RX ORDER — CEFAZOLIN SODIUM/WATER 2 G/20 ML
2 SYRINGE (ML) INTRAVENOUS
Status: COMPLETED | OUTPATIENT
Start: 2023-03-14 | End: 2023-03-14

## 2023-03-14 RX ORDER — ASPIRIN 81 MG/1
81 TABLET ORAL 2 TIMES DAILY
Status: DISCONTINUED | OUTPATIENT
Start: 2023-03-14 | End: 2023-03-14 | Stop reason: HOSPADM

## 2023-03-14 RX ORDER — ONDANSETRON 2 MG/ML
4 INJECTION INTRAMUSCULAR; INTRAVENOUS EVERY 6 HOURS PRN
Status: DISCONTINUED | OUTPATIENT
Start: 2023-03-14 | End: 2023-03-14 | Stop reason: HOSPADM

## 2023-03-14 RX ORDER — HYDROMORPHONE HCL IN WATER/PF 6 MG/30 ML
0.2 PATIENT CONTROLLED ANALGESIA SYRINGE INTRAVENOUS
Status: DISCONTINUED | OUTPATIENT
Start: 2023-03-14 | End: 2023-03-14 | Stop reason: HOSPADM

## 2023-03-14 RX ORDER — CEFAZOLIN SODIUM 1 G/3ML
1 INJECTION, POWDER, FOR SOLUTION INTRAMUSCULAR; INTRAVENOUS EVERY 8 HOURS
Status: DISCONTINUED | OUTPATIENT
Start: 2023-03-14 | End: 2023-03-14

## 2023-03-14 RX ORDER — LIDOCAINE HYDROCHLORIDE 20 MG/ML
INJECTION, SOLUTION INFILTRATION; PERINEURAL PRN
Status: DISCONTINUED | OUTPATIENT
Start: 2023-03-14 | End: 2023-03-14

## 2023-03-14 RX ORDER — ONDANSETRON 4 MG/1
4 TABLET, ORALLY DISINTEGRATING ORAL EVERY 30 MIN PRN
Status: DISCONTINUED | OUTPATIENT
Start: 2023-03-14 | End: 2023-03-14 | Stop reason: HOSPADM

## 2023-03-14 RX ORDER — ONDANSETRON 4 MG/1
4 TABLET, ORALLY DISINTEGRATING ORAL EVERY 6 HOURS PRN
Status: DISCONTINUED | OUTPATIENT
Start: 2023-03-14 | End: 2023-03-14 | Stop reason: HOSPADM

## 2023-03-14 RX ORDER — PROPOFOL 10 MG/ML
INJECTION, EMULSION INTRAVENOUS PRN
Status: DISCONTINUED | OUTPATIENT
Start: 2023-03-14 | End: 2023-03-14

## 2023-03-14 RX ORDER — GLYCOPYRROLATE 0.2 MG/ML
INJECTION, SOLUTION INTRAMUSCULAR; INTRAVENOUS PRN
Status: DISCONTINUED | OUTPATIENT
Start: 2023-03-14 | End: 2023-03-14

## 2023-03-14 RX ORDER — HYDROMORPHONE HCL IN WATER/PF 6 MG/30 ML
0.2 PATIENT CONTROLLED ANALGESIA SYRINGE INTRAVENOUS EVERY 5 MIN PRN
Status: CANCELLED | OUTPATIENT
Start: 2023-03-14

## 2023-03-14 RX ORDER — LABETALOL HYDROCHLORIDE 5 MG/ML
10 INJECTION, SOLUTION INTRAVENOUS
Status: DISCONTINUED | OUTPATIENT
Start: 2023-03-14 | End: 2023-03-14 | Stop reason: HOSPADM

## 2023-03-14 RX ORDER — AMOXICILLIN 250 MG
1 CAPSULE ORAL 2 TIMES DAILY
Status: DISCONTINUED | OUTPATIENT
Start: 2023-03-14 | End: 2023-03-14 | Stop reason: HOSPADM

## 2023-03-14 RX ORDER — HYDROMORPHONE HCL IN WATER/PF 6 MG/30 ML
0.4 PATIENT CONTROLLED ANALGESIA SYRINGE INTRAVENOUS
Status: DISCONTINUED | OUTPATIENT
Start: 2023-03-14 | End: 2023-03-14 | Stop reason: HOSPADM

## 2023-03-14 RX ORDER — ASPIRIN 81 MG/1
81 TABLET ORAL 2 TIMES DAILY
Qty: 60 TABLET | Refills: 0 | Status: SHIPPED | OUTPATIENT
Start: 2023-03-14 | End: 2023-08-21

## 2023-03-14 RX ADMIN — METHADONE HYDROCHLORIDE 2 MG: 10 INJECTION, SOLUTION INTRAMUSCULAR; INTRAVENOUS; SUBCUTANEOUS at 10:03

## 2023-03-14 RX ADMIN — Medication 2 G: at 07:50

## 2023-03-14 RX ADMIN — CEFAZOLIN 1 G: 1 INJECTION, POWDER, FOR SOLUTION INTRAMUSCULAR; INTRAVENOUS at 13:53

## 2023-03-14 RX ADMIN — MIDAZOLAM 2 MG: 1 INJECTION INTRAMUSCULAR; INTRAVENOUS at 07:42

## 2023-03-14 RX ADMIN — TRANEXAMIC ACID 1950 MG: 650 TABLET ORAL at 06:28

## 2023-03-14 RX ADMIN — PROPOFOL 200 MG: 10 INJECTION, EMULSION INTRAVENOUS at 07:43

## 2023-03-14 RX ADMIN — ACETAMINOPHEN 975 MG: 325 TABLET ORAL at 13:06

## 2023-03-14 RX ADMIN — SODIUM CHLORIDE, POTASSIUM CHLORIDE, SODIUM LACTATE AND CALCIUM CHLORIDE: 600; 310; 30; 20 INJECTION, SOLUTION INTRAVENOUS at 09:05

## 2023-03-14 RX ADMIN — GLYCOPYRROLATE 0.2 MG: 0.2 INJECTION, SOLUTION INTRAMUSCULAR; INTRAVENOUS at 08:50

## 2023-03-14 RX ADMIN — KETOROLAC TROMETHAMINE 15 MG: 15 INJECTION, SOLUTION INTRAMUSCULAR; INTRAVENOUS at 13:06

## 2023-03-14 RX ADMIN — METHADONE HYDROCHLORIDE 2 MG: 10 INJECTION, SOLUTION INTRAMUSCULAR; INTRAVENOUS; SUBCUTANEOUS at 10:23

## 2023-03-14 RX ADMIN — METHADONE HYDROCHLORIDE 10 MG: 10 INJECTION, SOLUTION INTRAMUSCULAR; INTRAVENOUS; SUBCUTANEOUS at 07:43

## 2023-03-14 RX ADMIN — DEXAMETHASONE SODIUM PHOSPHATE 8 MG: 4 INJECTION, SOLUTION INTRA-ARTICULAR; INTRALESIONAL; INTRAMUSCULAR; INTRAVENOUS; SOFT TISSUE at 07:43

## 2023-03-14 RX ADMIN — SODIUM CHLORIDE, POTASSIUM CHLORIDE, SODIUM LACTATE AND CALCIUM CHLORIDE: 600; 310; 30; 20 INJECTION, SOLUTION INTRAVENOUS at 07:40

## 2023-03-14 RX ADMIN — ASPIRIN 81 MG: 81 TABLET, COATED ORAL at 13:06

## 2023-03-14 RX ADMIN — LIDOCAINE HYDROCHLORIDE 50 MG: 20 INJECTION, SOLUTION INFILTRATION; PERINEURAL at 07:43

## 2023-03-14 RX ADMIN — ONDANSETRON 4 MG: 2 INJECTION INTRAMUSCULAR; INTRAVENOUS at 07:54

## 2023-03-14 ASSESSMENT — COPD QUESTIONNAIRES: COPD: 0

## 2023-03-14 ASSESSMENT — ACTIVITIES OF DAILY LIVING (ADL)
ADLS_ACUITY_SCORE: 21
ADLS_ACUITY_SCORE: 25
ADLS_ACUITY_SCORE: 21
ADLS_ACUITY_SCORE: 21
ADLS_ACUITY_SCORE: 25

## 2023-03-14 ASSESSMENT — LIFESTYLE VARIABLES: TOBACCO_USE: 1

## 2023-03-14 NOTE — PROGRESS NOTES
03/14/23 1431   Appointment Info   Signing Clinician's Name / Credentials (PT) Luisa Elaine DPT   Living Environment   People in Home spouse   Current Living Arrangements house   Home Accessibility stairs to enter home   Number of Stairs, Main Entrance 4   Stair Railings, Main Entrance railings on both sides of stairs   Number of Stairs, Within Home, Primary   (full flight)   Stair Railings, Within Home, Primary railing on left side (ascending)   Transportation Anticipated family or friend will provide   Living Environment Comments Patient lives in 2 story home with spouse.  Has single step to access walkway, then 4 steps to access home.  Can stay on main level.   Self-Care   Usual Activity Tolerance good   Regular Exercise   (walks almost 2 miles daily)   Equipment Currently Used at Home none   Fall history within last six months no   Activity/Exercise/Self-Care Comment Patient independent with mobility, however limited by pain.  Patient has a standard walker for use at home.   General Information   Onset of Illness/Injury or Date of Surgery 03/14/23   Referring Physician Jose Juan Torrez MD   Patient/Family Therapy Goals Statement (PT) return to home   Pertinent History of Current Problem (include personal factors and/or comorbidities that impact the POC) Patient seen POD#0 s/p Right knee unicompartmental arthroplasty   Existing Precautions/Restrictions fall   Cognition   Orientation Status (Cognition) oriented x 4   Pain Assessment   Patient Currently in Pain   (reports minimal pain with mobility, some stretching in the back of the knee)   Integumentary/Edema   Integumentary/Edema Comments Patient with ace wrap from base of toes to mid thigh   Posture    Posture Not impaired   Range of Motion (ROM)   Range of Motion ROM is WFL   ROM Comment Patient able to acheive greater than 120 degrees of knee flexion at right LE, lacking approx 10 degrees of right knee extension   Strength (Manual Muscle Testing)    Strength (Manual Muscle Testing) Able to perform R SLR;Able to perform L SLR   Bed Mobility   Comment, (Bed Mobility) Independent   Transfers   Transfers sit-stand transfer   Sit-Stand Transfer   Sit-Stand [place] (Transfers) contact guard;verbal cues   Assistive Device (Sit-Stand Transfers) walker, standard   Gait/Stairs (Locomotion)   [place] Level (Gait) contact guard   Assistive Device (Gait) walker, standard   Distance in Feet 10(eval)+150   Balance   Balance Comments No loss of balance with mobility, no knee buckling apparent.  Requires bilateral UE support at walker   Sensory Examination   Sensory Perception Comments Patient reports decreased sensation at right thumb and right foot   Coordination   Coordination no deficits were identified   Muscle Tone   Muscle Tone no deficits were identified   Clinical Impression   Criteria for Skilled Therapeutic Intervention Yes, treatment indicated   PT Diagnosis (PT) Impaired functional mobility   Influenced by the following impairments pain, decreased strength, decreased ROM, decreased sensation   Functional limitations due to impairments difficulties with gait/stair negotiation   Clinical Presentation (PT Evaluation Complexity) Stable/Uncomplicated   Clinical Presentation Rationale clinical judgement   Clinical Decision Making (Complexity) low complexity   Planned Therapy Interventions (PT) gait training;patient/family education;home exercise program;ROM (range of motion);stair training;strengthening;transfer training;progressive activity/exercise   Anticipated Equipment Needs at Discharge (PT)   (patient has walker for use at home)   Risk & Benefits of therapy have been explained evaluation/treatment results reviewed;care plan/treatment goals reviewed;risks/benefits reviewed;current/potential barriers reviewed;participants voiced agreement with care plan;participants included;patient;spouse/significant other;daughter   PT Total Evaluation Time   PT Eval,  Low Complexity Minutes (47818) 10   Physical Therapy Goals   PT Frequency One time eval and treatment only   PT Predicted Duration/Target Date for Goal Attainment 03/14/23   PT Goals Transfers;Gait;Stairs   PT: Transfers Supervision/stand-by assist;Sit to/from stand;Bed to/from chair;Assistive device;Goal Met   PT: Gait Supervision/stand-by assist;Assistive device;150 feet;Goal Met   PT: Stairs Supervision/stand-by assist;Assistive device;4 stairs;Rail on both sides;Goal Met   Interventions   Interventions Quick Adds Gait Training;Therapeutic Activity;Therapeutic Procedure   Therapeutic Procedure/Exercise   Ther. Procedure: strength, endurance, ROM, flexibillity Minutes (24608) 15   Symptoms Noted During/After Treatment fatigue;increased pain   Treatment Detail/Skilled Intervention Facilitated therapeutic exercise to increase independence with transfers and ambulation.  Patient performed the following exercises x 10 reps with verbal and tactile cueing for correct technique: ankle DF/PF, quad/ham/glut sets, heel slides, SAQ, SLR, supine knee extension stretch, seated knee flexion stretch (able to achieve approx 80 degrees knee flexion).  Handout provided.   Therapeutic Activity   Therapeutic Activities: dynamic activities to improve functional performance Minutes (44945) 10   Symptoms Noted During/After Treatment Fatigue   Treatment Detail/Skilled Intervention Patient supine upon arrival.  Patient performed bed mobility independently. Able to demonstrate repeated SLR, no KI required. Patient transferred between sitting and standing with 2WW and graded assist from [facility] to SBA.  Returned to supine independently, patient reporting significant fatigue.  Patient and spouse educated in car transfers.   Gait Training   Gait Training Minutes (58762) 15   Symptoms Noted During/After Treatment (Gait Training) fatigue;increased pain   Treatment Detail/Skilled Intervention Patient amb 150 feet with standard walker and graded  assist from CGA to SBA.  Patient with decreased gait speed, increased trunk flexion, increased reliance on UE support at walker, fair foot clearance.  Able to demonstrate step through gait pattern with verbal cueing.  Patient instructed in stair negotiation.  Facilitated negotiation of 4 steps with 2 rails and CGA and verbal cueing, 1 platform step with standard walker and family providing stabilization at walker with verbal cueing from therapist.  Patient did trial a 2WW, however preferred standard walker from home.  Also has a 2WW at home, discussed that patient may want to trial 2WW after 1-2 days again for increased gait speed, gait pattern   PT Discharge Planning   PT Plan dc   PT Discharge Recommendation (DC Rec)   (per ortho md)   PT Rationale for DC Rec Anticipate that with ongoing medical management and skilled IP PT, patient will demonstrate functional, household mobility with SBA and least restrictive assistive device   PT Brief overview of current status Patient has met all PT goals; has demonstrated mobility with supervision and walker.   Total Session Time   Timed Code Treatment Minutes 40   Total Session Time (sum of timed and untimed services) 50       [facility]  OUTPATIENT PHYSICAL THERAPY EVALUATION  PLAN OF TREATMENT FOR OUTPATIENT REHABILITATION  (COMPLETE FOR INITIAL CLAIMS ONLY)  Patient's Last Name, First Name, M.I.  YOB: 1955  Layla Lazaro                        Provider's Name  [facility] Medical Record No.  [id number]                             Onset Date:  03/14/23   Start of Care Date:     3/14/23   Type:     _X_PT   ___OT   ___SLP Medical Diagnosis:       R unicompartmental TKA          PT Diagnosis:  Impaired functional mobility Visits from SOC:  1     See note for plan of treatment, functional goals and certification details    I CERTIFY THE NEED FOR THESE SERVICES FURNISHED UNDER        THIS  PLAN OF TREATMENT AND WHILE UNDER MY CARE     (Physician co-signature of this document indicates review and certification of the therapy plan).

## 2023-03-14 NOTE — ANESTHESIA CARE TRANSFER NOTE
Patient: Layla Lazaro    Procedure: Procedure(s):  Right knee unicompartmental arthroplasty       Diagnosis: Osteoarthrosis, localized, primary, knee, right [M17.11]  Diagnosis Additional Information: No value filed.    Anesthesia Type:   General     Note:    Oropharynx: oropharynx clear of all foreign objects  Level of Consciousness: awake  Oxygen Supplementation: face mask    Independent Airway: airway patency satisfactory and stable  Dentition: dentition unchanged  Vital Signs Stable: post-procedure vital signs reviewed and stable  Report to RN Given: handoff report given  Patient transferred to: PACU    Handoff Report: Identifed the Patient, Identified the Reponsible Provider, Reviewed the pertinent medical history, Discussed the surgical course, Reviewed Intra-OP anesthesia mangement and issues during anesthesia, Set expectations for post-procedure period and Allowed opportunity for questions and acknowledgement of understanding      Vitals:  Vitals Value Taken Time   /81 03/14/23 0940   Temp     Pulse 83 03/14/23 0940   Resp 28 03/14/23 0942   SpO2 100 % 03/14/23 0942   Vitals shown include unvalidated device data.    Electronically Signed By: MATTHEW Orr CRNA  March 14, 2023  9:44 AM

## 2023-03-14 NOTE — OP NOTE
[facility]    Operative Note    Pre-operative diagnosis: 68-year-old female presenting with chronic right knee pain due to end-stage osteoarthritic changes most notably in medial compartment.  Insufficiently responding to nonsurgical treatment options    Post-operative diagnosis Same as pre-operative diagnosis    Procedure: Procedure(s):  Right knee unicompartmental arthroplasty  Surgeon: Surgeon(s) and Role:     * Jose Juan Torrez MD - Primary       * Marva Nelson - Fellow  Anesthesia: General   Estimated Blood Loss: 200 ml    Drains: None  Specimens: * No specimens in log *    Complications: None.  Implants:   Implant Name Type Inv. Item Serial No.  Lot No. LRB No. Used Action   BONE CEMENT SIMPLEX FULL DOSE 6191-1-001 - [id number] Cement, Bone BONE CEMENT SIMPLEX FULL DOSE 6191-1-001  [employer] [id number] Right 1 Implanted   KNEE OXFORD UNI FEMORAL SM - [id number] Total Joint Component/Insert KNEE OXFORD UNI FEMORAL SM  [employer] [id number] Right 1 Implanted   KNEE UNI TIBIA TRAY MOBILE BEAR SZ B RM - [id number] Total Joint Component/Insert KNEE UNI TIBIA TRAY MOBILE BEAR SZ B RM  [employer] [id number] Right 1 Implanted   INSERT OXFORD MENISCAL BEAR 3MM - [id number] Total Joint Component/Insert INSERT OXFORD MENISCAL BEAR 3MM  [employer] [id number] Right 1 Implanted       [facility]     Operative Note        Components used: [employer] [place] Unicondylar Knee right medial Arthroplasty femur size Small, tibial component size B and insert 3 mm.     Description of procedure:  Patient was seen in the preoperative holding where procedure, risks and complications, expectations and rehabilitation were discussed once more.  Patient understands and agrees to the treatment plan as set forth.  The right lower extremity was marked and informed consent was obtained.  Patient was then taken to the operating room where general  anesthesia was induced.  Patient was positioned supine with the operative leg in the Oxford unicondylar knee leg holders.  The right lower extremitywere then prepped and draped in usual sterile fashion.  After the completion of a timeout procedure a skin incision was made from the superomedial patellar border down to the tibial tuberosity.  The subcutaneous tissue was opened using cautery.  Now the medial extensor mechanism and retinaculum was identified.  A medial parapatellar arthrotomy was performed.  Hoffa's fat pad was incised.  Now the anterior horn of the medial meniscus was removed and the medial capsular flap was developed.  A Hohmann was placed to protect the medial collateral ligament.  Now the first the ACL was inspected which was intact.  The patellofemoral joint showed minimal grade 1-2 osteoarthritic changes of the trochlea. The patellar articular surface was intact.  The lateral compartment showed intact articular surfaces with an intact meniscus as far as could be visualized.  It was decided to continue with the procedure as planned. First the osteophytes in the medial notch, the medial femoral condyle and medial tibial plateau were removed using osteotomes and rongeurs.  Then the external tibial alignment guide was used and placed in such a manner that the slope and varus valgus alignment were appropriate.  Using the G3 clamp with the large jig at +0 mm the cutting block was placed in the right position.  This was then pinned using 2 pins.  A vertical osteotomy just medial of the tibial eminence was made using a reciprocating saw.  Now using an oscillating saw a horizontal tibial cut was made.  The bone block was resected and it was verified no bone fragments remained.    Now the femoral intramedullary canal was opened with the placed 1 cm anterior of the PCL insertion and 2 to 3 mm medial.  The center of the medial condyle was now marked.  The femoral drillguide was placed and pinned into place  taking into acount the appropriate orientation.  The posterior femoral cut was made.The flexion gap was measured and deemed adequate. The alignment guide was removed and a 0 spigot was placed.  The first milling was performed.  Excess osteophytes were removed.  At this point the remainder of the posterior medial meniscus was removed as well.  Now the trial components were placed in the flexion extension gap was measured.  A 3 mm was preferred in flexion and a 1 mm insert was preferred in extension.  Using the #2 spigot the second milling was done.  Again access osteophytes were removed.  Now the trial components were placed again and with a 3 spacer block the knee was well balanced in both flexion and extension.  At this point the femoral component preparation was finalized by means of the unicorn reaming guide with the posterior osteophyte cutting block.  Once this was completed the tibial component was further prepared.  Using the T-handle it was ascertained that the tibial component was not placed to posteriorly.  The tibial component was then pinned in place and the slot was prepared using the keel prep.  Once more the trial components were placed with a 3 mm liner.  Again the knee was well balanced and had a full range of motion. Tourniquet was inflated to 250mmHg  All trial components were then removed and the knee was copiously irrigated.  The final components which include a tibial plateau size B and femoral component size small were cemented in place.  Excess cement was removed well cement was cured with the knee and 30 degrees of flexion and a 3 mm spacer block in place.  Once the cement was cured a posterior and anterior capsular block was placed using the anaesthetic cocktail. The definitive 3 mm liner was placed.  Again the knee was copiously irrigated.   The medial parapatellar arthrotomy was closed using interrupted figure-of-8  #1 Vicryl sutures.  Subcutaneous tissue was closed using 2-0 Vicryl  sutures.  The skin was closed using a 3-0 PDS suture.  The tourniquet was deflated.     The incision was gently and compressively dressed with Steri-Strips and a combination of Alginate and Tegaderm.  Patient was awakened and returned to recovery in good condition.     Postoperative plan:  X-rays in PACU  Weightbearing as tolerated  Antibiotics per protocol  Plan on discharge today or tomorrow  PT/OT  Aspirin 162 mg daily for 4 weeks as DVT prophylaxis  Clinic visit Dr. Torrez 2 and 6 weeks      Jose Juan Torrez MD     Adult Reconstruction  [facility] Department of Orthopaedic Surgery  Pager [phone]

## 2023-03-14 NOTE — ANESTHESIA PREPROCEDURE EVALUATION
Anesthesia Pre-Procedure Evaluation    Patient: Layla Lazaro   MRN: [id number] : 1955        Procedure : Procedure(s):  Right knee unicompartmental arthroplasty  versus right total knee arthroplasty          Past Medical History:   Diagnosis Date     Colon polyps      Heart murmur      Hemochromatosis     gives blood every 3 mo      Past Surgical History:   Procedure Laterality Date     COLONOSCOPY       EYE SURGERY      as child     MOUTH SURGERY      wisdom teeth age 17      No Known Allergies   Social History     Tobacco Use     Smoking status: Former     Types: Cigarettes     Quit date:      Years since quittin.2     Smokeless tobacco: Never   Substance Use Topics     Alcohol use: Not Currently      Wt Readings from Last 1 Encounters:   23 68.9 kg (152 lb)        Anesthesia Evaluation   Pt has had prior anesthetic. Type: General.    No history of anesthetic complications       ROS/MED HX  ENT/Pulmonary:     (+) tobacco use, Past use,  (-) asthma, COPD and recent URI   Neurologic:  - neg neurologic ROS     Cardiovascular:     (+) Dyslipidemia -----valvular problems/murmurs type: AS     METS/Exercise Tolerance: >4 METS    Hematologic: Comments: Hemochromatosis      Musculoskeletal:       GI/Hepatic:  - neg GI/hepatic ROS  (-) GERD   Renal/Genitourinary:  - neg Renal ROS     Endo:  - neg endo ROS     Psychiatric/Substance Use:       Infectious Disease:       Malignancy:       Other:            Physical Exam    Airway        Mallampati: III   TM distance: > 3 FB   Neck ROM: full   Mouth opening: > 3 cm    Respiratory Devices and Support         Dental       (+) Completely normal teeth      Cardiovascular          Rhythm and rate: regular and normal   (+) murmur       Pulmonary   pulmonary exam normal                OUTSIDE LABS:  CBC:   Lab Results   Component Value Date    WBC 7.6 2023    WBC 8.5 2010    HGB 15.5 2023    HGB 15.5 2010    HCT 45.6 2023     HCT 44.3 12/28/2010     02/27/2023     12/28/2010     BMP:   Lab Results   Component Value Date     02/27/2023     12/28/2010    POTASSIUM 3.8 02/27/2023    POTASSIUM 3.8 12/28/2010    CHLORIDE 104 02/27/2023    CHLORIDE 102 12/28/2010    CO2 25 02/27/2023    CO2 27 12/28/2010    BUN 16.7 02/27/2023    BUN 10 12/28/2010    CR 0.57 02/27/2023    CR 0.65 12/28/2010    GLC 89 02/27/2023    GLC 96 12/28/2010     COAGS: No results found for: PTT, INR, FIBR  POC: No results found for: BGM, HCG, HCGS  HEPATIC: No results found for: ALBUMIN, PROTTOTAL, ALT, AST, GGT, ALKPHOS, BILITOTAL, BILIDIRECT, NGUYEN  OTHER:   Lab Results   Component Value Date    DANYEL 9.7 02/27/2023       Anesthesia Plan    ASA Status:  2   NPO Status:  NPO Appropriate    Anesthesia Type: General.     - Airway: LMA   Induction: Intravenous.   Maintenance: Balanced.        Consents    Anesthesia Plan(s) and associated risks, benefits, and realistic alternatives discussed. Questions answered and patient/representative(s) expressed understanding.    - Discussed:     - Discussed with:  Patient      - Extended Intubation/Ventilatory Support Discussed: No.      - Patient is DNR/DNI Status: No    Use of blood products discussed: No .     Postoperative Care    Pain management: IV analgesics, Oral pain medications, Multi-modal analgesia.   PONV prophylaxis: Ondansetron (or other 5HT-3), Dexamethasone or Solumedrol, Background Propofol Infusion     Comments:    Other Comments: Lori Jimenez MD

## 2023-03-14 NOTE — PLAN OF CARE
Physical Therapy Discharge Summary     Reason for therapy discharge:    All goals and outcomes met, no further needs identified.     Progress towards therapy goal(s). See goals on Care Plan in Epic electronic health record for goal details.  Goals met     Therapy recommendation(s):    Continue home exercise program.  Patient reports she will start OP PT on 3/16   Recommend use of walker with all mobility, supervision from spouse or daughter with stair negotiation.

## 2023-03-14 NOTE — ANESTHESIA POSTPROCEDURE EVALUATION
Patient: Layla Lazaro    Procedure: Procedure(s):  Right knee unicompartmental arthroplasty       Anesthesia Type:  General    Note:  Disposition: Inpatient   Postop Pain Control: Uneventful            Sign Out: Well controlled pain   PONV: No   Neuro/Psych: Uneventful            Sign Out: Acceptable/Baseline neuro status   Airway/Respiratory: Uneventful            Sign Out: Acceptable/Baseline resp. status   CV/Hemodynamics: Uneventful            Sign Out: Acceptable CV status; No obvious hypovolemia; No obvious fluid overload   Other NRE: NONE   DID A NON-ROUTINE EVENT OCCUR? No           Last vitals:  Vitals Value Taken Time   /89 03/14/23 1115   Temp 99.3  F (37.4  C) 03/14/23 1045   Pulse 81 03/14/23 1115   Resp 12 03/14/23 1115   SpO2 96 % 03/14/23 1116   Vitals shown include unvalidated device data.    Electronically Signed By: Andreea Jimenez MD  March 14, 2023  11:19 AM

## 2023-03-14 NOTE — PROGRESS NOTES
Patient vital signs are at baseline: Yes  Patient able to ambulate as they were prior to admission or with assist devices provided by therapies during their stay:  Yes  Patient MUST void prior to discharge:  Yes  Patient able to tolerate oral intake:  Yes  Pain has adequate pain control using Oral analgesics:  Yes  Does patient have an identified :  Yes  Has goal D/C date and time been discussed with patient:  Yes    Fast track.    Patient aox4. VSS on RA. CMS intact. Numbness improving. No narcotic pain medication required at this time. Passed PT. Medications signed for. AVS signed. Family will assist at home.

## 2023-03-14 NOTE — PLAN OF CARE
Goal Outcome Evaluation:      Plan of Care Reviewed With: patient    Overall Patient Progress: improving    Patient vital signs are at baseline: Yes. On room air  Patient able to ambulate as they were prior to admission or with assist devices provided by therapies during their stay:  Yes. PT pending,   Patient MUST void prior to discharge:  Yes  Patient able to tolerate oral intake:  Yes  Pain has adequate pain control using Oral analgesics:  Yes  Does patient have an identified :  Yes  Has goal D/C date and time been discussed with patient:  Yes.      1315 following plan of care. PT pending.   Variances- Right index finger, thumb numbness. Good strength. Oriented x4. Right foot mild numbness post op. Good strength. Will monitor.   1445 following plan of care. Plan is home today after PT today. Patient and family at bedside. Dressing clean and dry. Pain controlled.

## 2023-03-15 NOTE — PROGRESS NOTES
Post op discharge phone call for Dr. Sheldon patients:  [facility]    Type of surgery:R TKA  Date of surgery:3/14/23    HI,  I am a registered nurse calling from the Orthopedic unit at [facility], checking in to see how you are doing following your Knee surgery.    1.  Is your pain level manageable? Yes  2.  Are you having any new or increased numbness or tingling in your surgical leg? No  3. Are you having increased swelling  or drainage around your surgical site or surgical dressing?No  4. Have you been able to walk short distances around your house?Yes  5. Have you been able to urinate since you have been home?Yes  6. Do you have your first physical therapy session set up?Yes-Thursday   7. Do you have any questions or concerns at this time? No    Please call Dr. Sheldon's office ([phone]) of any concerns as you daily review your stoplight tool for symptom management.  Thank you, have a great recovery!

## 2023-03-15 NOTE — PROGRESS NOTES
Post op discharge phone call for Dr. Sheldon patients:  [facility]    Type of surgery: R TKA  Date of surgery: 3/14/23    HI,  I am a registered nurse calling from the Orthopedic unit at [facility], checking in to see how you are doing following your Knee surgery.    1.  Is your pain level manageable? Yes  2.  Are you having any new or increased numbness or tingling in your surgical leg? No  3. Are you having increased swelling  or drainage around your surgical site or surgical dressing? No  4. Have you been able to walk short distances around your house? Yes  5. Have you been able to urinate since you have been home? Yes  6. Do you have your first physical therapy session set up? Thursday  7. Do you have any questions or concerns at this time? No (didn't sleep well last night)    Please call Dr. Sheldon's office ([phone]) of any concerns as you daily review your stoplight tool for symptom management.  Thank you, have a great recovery!

## 2023-03-16 ENCOUNTER — TRANSFERRED RECORDS (OUTPATIENT)
Dept: HEALTH INFORMATION MANAGEMENT | Facility: CLINIC | Age: 68
End: 2023-03-16

## 2023-03-17 ASSESSMENT — KOOS JR
BENDING TO THE FLOOR TO PICK UP OBJECT: MODERATE
HOW SEVERE IS YOUR KNEE STIFFNESS AFTER FIRST WAKING IN MORNING: SEVERE
STRAIGHTENING KNEE FULLY: SEVERE
KOOS JR SCORING: 47.49
STANDING UPRIGHT: MILD
TWISING OR PIVOTING ON KNEE: EXTREME
RISING FROM SITTING: MILD
GOING UP OR DOWN STAIRS: MODERATE

## 2023-03-24 ENCOUNTER — OFFICE VISIT (OUTPATIENT)
Dept: ORTHOPEDICS | Facility: CLINIC | Age: 68
End: 2023-03-24
Payer: COMMERCIAL

## 2023-03-24 DIAGNOSIS — Z96.651 STATUS POST UNICOMPARTMENTAL KNEE REPLACEMENT, RIGHT: Primary | ICD-10-CM

## 2023-03-24 PROCEDURE — 99024 POSTOP FOLLOW-UP VISIT: CPT | Performed by: PHYSICIAN ASSISTANT

## 2023-03-24 NOTE — LETTER
3/24/2023         RE: Layla Lazaro  [address] [address]        Dear Colleague,    Thank you for referring your patient, Layla Lazaro, to the [facility] [place]. Please see a copy of my visit note below.        [facility]  Orthopaedic Surgery Consultation by Jose Juan Torrez M.D.    Layla Lazaro MRN# [id number]   Age: 67 year old YOB: 1955     Requesting physician: Yecenia Marquez     Background history:  DX:  1. Hemochromatosis    TREATMENTS:  1. 3/14/2023, right knee unicompartmental arthroplasty, Dr. Torrez, [facility]           History of Present Illness:   67 year old female who presents to clinic today approximately 2 weeks status post right knee unicompartmental arthroplasty.  Her pain is well controlled with Tylenol and ibuprofen.  She states she has some discomfort at night and has difficulty sleeping.  She made good progress in physical therapy.  She is ambulating with a cane.  She is taking aspirin for DVT prophylaxis.  She denies any calf pain, shortness of breath or chest pain.    Social:   Occupation: retired  Living situation: lives with spouse  Hobbies / Sports: walking    Smoking: No  Alcohol: Yes  Illicit drug use: No         Physical Exam:     EXAMINATION pertinent findings:   PSYCH: Pleasant, healthy-appearing, alert, oriented x3, cooperative. Normal mood and affect.  VITAL SIGNS: not currently breastfeeding.  Reviewed nursing intake notes.   There is no height or weight on file to calculate BMI.  RESP: non labored breathing   ABD: benign, soft, non-tender, no acute peritoneal findings  SKIN: grossly normal   LYMPHATIC: grossly normal, no adenopathy, no extremity edema  NEURO: grossly normal , no motor deficits  VASCULAR: satisfactory perfusion of all extremities   MUSCULOSKELETAL:   Alignment: Varus alignment  Gait: Normal gait.    R knee: The incision is clean  dry and intact with no erythema, dehiscence, discharge.  Diffuse ecchymosis noted along calf and foot.  Moderate joint effusion noted.  -5-0  .  Deep flexion is recognizably painful.  Straight leg raise +.  Effusion Some.  Calves are soft nontender with negative Homans' sign    Right LE:   Thigh and leg compartments soft and compressible   +Quad/TA/GSC/FHL/EHL   SILT DP/SP/Sarah/Saph/Tib nerve distributions   Palpable dorsalis pedis pulse          Data:   All laboratory data reviewed  All imaging studies reviewed by me personally.           Assessment and Plan:   Assessment:  67-year-old female with history of chronic right knee pain due to end-stage osteoarthritic changes most notably in medial compartment who was insufficiently responding to nonsurgical treatment options therefore underwent a right knee unicompartmental arthroplasty on 3/14/2023.  Recovering appropriately     Plan:  I extensively discussed my findings with the patient.  We discussed the intraoperative findings and today's findings.  Patient will continue to work with physical therapy on range of motion, strengthening and gait training.  Patient will continue their DVT prophylaxis until 4 weeks postoperatively.  Suture tails were removed.  She can now shower but should avoid submersing for 3 months.  I recommended compression, ice and elevation for joint effusion.  All questions were answered.  Patient understands and agrees to the treatment plan as set forth.  We will follow-up with patient at the 6-week postoperative date with renewed radiographic imaging studies.    Tyshawn Page PA-C  Physician Assistant   Oncology and Adult Reconstructive Surgery  Dept Orthopaedic Surgery, [facility]             DATA for DOCUMENTATION:         Past Medical History:   There is no problem list on file for this patient.    Past Medical History:   Diagnosis Date     Colon polyps      Heart murmur      Hemochromatosis     gives blood every 3 mo        Also see scanned health assessment forms.       Past Surgical History:     Past Surgical History:   Procedure Laterality Date     ARTHROPLASTY KNEE UNICOMPARTMENT Right 3/14/2023    Procedure: Right knee unicompartmental arthroplasty;  Surgeon: Jose Juan Torrez MD;  Location: [facility] OR     COLONOSCOPY       EYE SURGERY      as child     MOUTH SURGERY      wisdom teeth age 17            Social History:     Social History     Socioeconomic History     Marital status:      Spouse name: Not on file     Number of children: Not on file     Years of education: Not on file     Highest education level: Not on file   Occupational History     Not on file   Tobacco Use     Smoking status: Former     Types: Cigarettes     Quit date:      Years since quittin.2     Smokeless tobacco: Never   Substance and Sexual Activity     Alcohol use: Not Currently     Drug use: Never     Sexual activity: Not on file   Other Topics Concern     Parent/sibling w/ CABG, MI or angioplasty before 65F 55M? Not Asked   Social History Narrative     Not on file     Social Determinants of Health     Financial Resource Strain: Not on file   Food Insecurity: Not on file   Transportation Needs: Not on file   Physical Activity: Not on file   Stress: Not on file   Social Connections: Not on file   Intimate Partner Violence: Not on file   Housing Stability: Not on file            Family History:       Family History   Problem Relation Age of Onset     Colon Cancer Father             Medications:     Current Outpatient Medications   Medication Sig     acetaminophen (TYLENOL) 325 MG tablet Take 2 tablets (650 mg) by mouth every 4 hours as needed for other (mild pain)     acetaminophen (TYLENOL) 500 MG tablet Take 1,000 mg by mouth every 6 hours as needed for mild pain     aspirin 81 MG EC tablet Take 1 tablet (81 mg) by mouth 2 times daily     clobetasol (TEMOVATE) 0.05 % external ointment Apply topically as needed     Cyanocobalamin (VITAMIN  B 12) 100 MCG LOZG Take 100 mcg by mouth every morning     fexofenadine (ALLEGRA) 180 MG tablet Take 180 mg by mouth every morning     ibuprofen (ADVIL/MOTRIN) 600 MG tablet Take 1 tablet (600 mg) by mouth every 6 hours as needed for mild pain     oxyCODONE (ROXICODONE) 5 MG tablet Take 1-2 tablets (5-10 mg) by mouth every 4 hours as needed for moderate to severe pain     polyethylene glycol (MIRALAX) 17 g packet Take 17 g by mouth daily     senna-docusate (SENOKOT-S/PERICOLACE) 8.6-50 MG tablet Take 1-2 tablets by mouth 2 times daily Take while on oral narcotics to prevent or treat constipation.     simvastatin (ZOCOR) 40 MG tablet Take 40 mg by mouth every morning     Vitamin D3 (CHOLECALCIFEROL) 25 mcg (1000 units) tablet Take 25 mcg by mouth every morning     No current facility-administered medications for this visit.              Review of Systems:   A comprehensive 10 point review of systems (constitutional, ENT, cardiac, peripheral vascular, lymphatic, respiratory, GI, , Musculoskeletal, skin, Neurological) was performed and found to be negative except as described in this note.     See intake form completed by patient      Again, thank you for allowing me to participate in the care of your patient.        Sincerely,        Tyshawn Page PA-C

## 2023-03-24 NOTE — PROGRESS NOTES
[facility]  Orthopaedic Surgery Consultation by Jose Juan Torrez M.D.    Layla Lazaro MRN# [id number]   Age: 67 year old YOB: 1955     Requesting physician: Yecenia Marquez     Background history:  DX:  1. Hemochromatosis    TREATMENTS:  1. 3/14/2023, right knee unicompartmental arthroplasty, Dr. Torrez, [facility]           History of Present Illness:   67 year old female who presents to clinic today approximately 2 weeks status post right knee unicompartmental arthroplasty.  Her pain is well controlled with Tylenol and ibuprofen.  She states she has some discomfort at night and has difficulty sleeping.  She made good progress in physical therapy.  She is ambulating with a cane.  She is taking aspirin for DVT prophylaxis.  She denies any calf pain, shortness of breath or chest pain.    Social:   Occupation: retired  Living situation: lives with spouse  Hobbies / Sports: walking    Smoking: No  Alcohol: Yes  Illicit drug use: No         Physical Exam:     EXAMINATION pertinent findings:   PSYCH: Pleasant, healthy-appearing, alert, oriented x3, cooperative. Normal mood and affect.  VITAL SIGNS: not currently breastfeeding.  Reviewed nursing intake notes.   There is no height or weight on file to calculate BMI.  RESP: non labored breathing   ABD: benign, soft, non-tender, no acute peritoneal findings  SKIN: grossly normal   LYMPHATIC: grossly normal, no adenopathy, no extremity edema  NEURO: grossly normal , no motor deficits  VASCULAR: satisfactory perfusion of all extremities   MUSCULOSKELETAL:   Alignment: Varus alignment  Gait: Normal gait.    R knee: The incision is clean dry and intact with no erythema, dehiscence, discharge.  Diffuse ecchymosis noted along calf and foot.  Moderate joint effusion noted.  -5-0  .  Deep flexion is recognizably painful.  Straight leg raise +.  Effusion Some.  Calves are soft nontender with negative Homans'  sign    Right LE:   Thigh and leg compartments soft and compressible   +Quad/TA/GSC/FHL/EHL   SILT DP/SP/Sarah/Saph/Tib nerve distributions   Palpable dorsalis pedis pulse          Data:   All laboratory data reviewed  All imaging studies reviewed by me personally.           Assessment and Plan:   Assessment:  67-year-old female with history of chronic right knee pain due to end-stage osteoarthritic changes most notably in medial compartment who was insufficiently responding to nonsurgical treatment options therefore underwent a right knee unicompartmental arthroplasty on 3/14/2023.  Recovering appropriately     Plan:  I extensively discussed my findings with the patient.  We discussed the intraoperative findings and today's findings.  Patient will continue to work with physical therapy on range of motion, strengthening and gait training.  Patient will continue their DVT prophylaxis until 4 weeks postoperatively.  Suture tails were removed.  She can now shower but should avoid submersing for 3 months.  I recommended compression, ice and elevation for joint effusion.  All questions were answered.  Patient understands and agrees to the treatment plan as set forth.  We will follow-up with patient at the 6-week postoperative date with renewed radiographic imaging studies.    Tyshawn Page PA-C  Physician Assistant   Oncology and Adult Reconstructive Surgery  Dept Orthopaedic Surgery, [facility]             DATA for DOCUMENTATION:         Past Medical History:   There is no problem list on file for this patient.    Past Medical History:   Diagnosis Date     Colon polyps      Heart murmur      Hemochromatosis     gives blood every 3 mo       Also see scanned health assessment forms.       Past Surgical History:     Past Surgical History:   Procedure Laterality Date     ARTHROPLASTY KNEE UNICOMPARTMENT Right 3/14/2023    Procedure: Right knee unicompartmental arthroplasty;  Surgeon: Jose Juan Torrez MD;   Location: [facility] OR     COLONOSCOPY       EYE SURGERY      as child     MOUTH SURGERY      wisdom teeth age 17            Social History:     Social History     Socioeconomic History     Marital status:      Spouse name: Not on file     Number of children: Not on file     Years of education: Not on file     Highest education level: Not on file   Occupational History     Not on file   Tobacco Use     Smoking status: Former     Types: Cigarettes     Quit date:      Years since quittin.2     Smokeless tobacco: Never   Substance and Sexual Activity     Alcohol use: Not Currently     Drug use: Never     Sexual activity: Not on file   Other Topics Concern     Parent/sibling w/ CABG, MI or angioplasty before 65F 55M? Not Asked   Social History Narrative     Not on file     Social Determinants of Health     Financial Resource Strain: Not on file   Food Insecurity: Not on file   Transportation Needs: Not on file   Physical Activity: Not on file   Stress: Not on file   Social Connections: Not on file   Intimate Partner Violence: Not on file   Housing Stability: Not on file            Family History:       Family History   Problem Relation Age of Onset     Colon Cancer Father             Medications:     Current Outpatient Medications   Medication Sig     acetaminophen (TYLENOL) 325 MG tablet Take 2 tablets (650 mg) by mouth every 4 hours as needed for other (mild pain)     acetaminophen (TYLENOL) 500 MG tablet Take 1,000 mg by mouth every 6 hours as needed for mild pain     aspirin 81 MG EC tablet Take 1 tablet (81 mg) by mouth 2 times daily     clobetasol (TEMOVATE) 0.05 % external ointment Apply topically as needed     Cyanocobalamin (VITAMIN B 12) 100 MCG LOZG Take 100 mcg by mouth every morning     fexofenadine (ALLEGRA) 180 MG tablet Take 180 mg by mouth every morning     ibuprofen (ADVIL/MOTRIN) 600 MG tablet Take 1 tablet (600 mg) by mouth every 6 hours as needed for mild pain     oxyCODONE (ROXICODONE) 5  MG tablet Take 1-2 tablets (5-10 mg) by mouth every 4 hours as needed for moderate to severe pain     polyethylene glycol (MIRALAX) 17 g packet Take 17 g by mouth daily     senna-docusate (SENOKOT-S/PERICOLACE) 8.6-50 MG tablet Take 1-2 tablets by mouth 2 times daily Take while on oral narcotics to prevent or treat constipation.     simvastatin (ZOCOR) 40 MG tablet Take 40 mg by mouth every morning     Vitamin D3 (CHOLECALCIFEROL) 25 mcg (1000 units) tablet Take 25 mcg by mouth every morning     No current facility-administered medications for this visit.              Review of Systems:   A comprehensive 10 point review of systems (constitutional, ENT, cardiac, peripheral vascular, lymphatic, respiratory, GI, , Musculoskeletal, skin, Neurological) was performed and found to be negative except as described in this note.     See intake form completed by patient

## 2023-04-17 ENCOUNTER — TRANSFERRED RECORDS (OUTPATIENT)
Dept: HEALTH INFORMATION MANAGEMENT | Facility: CLINIC | Age: 68
End: 2023-04-17

## 2023-04-18 ENCOUNTER — TELEPHONE (OUTPATIENT)
Dept: ORTHOPEDICS | Facility: CLINIC | Age: 68
End: 2023-04-18
Payer: COMMERCIAL

## 2023-04-18 NOTE — TELEPHONE ENCOUNTER
Patient has a partial knee replacement on 3/14/23 and has questions regarding OTC meds that she is currently taking and wondering if she is taking the correct dosage, she takes  Ibuprofen 400 mg 3 times a day  Tylenol 500 mg 3 times a day  Baby aspirin 2 times a day    She is also wondering when can she be clear to travel by plane to [place].

## 2023-04-19 NOTE — TELEPHONE ENCOUNTER
Phone call to patient.   Inquired if her PCP has told her to limit Tylenol and Ibuprofen. She states she has hemochromatosis but has not been told to limit Tylenol.   Discussed the doses she is taking below are less than an adult dose. If it is managing her pain, she may continue it.   Ibuprofen can be continued at same dose also. Discussed it is best to take the least amount of pain medication needed to manage her pain.   She was informed she may stop the aspirin.     She wants to fly to [place] for about 5 days on 5/7/23. She is not sure if she is going yet but wanted to know if she needed clearance. Will discuss with provider and get back with her.     JAZZY Bergman RN

## 2023-04-19 NOTE — TELEPHONE ENCOUNTER
Discussed with provider. Ideally patient to wait 6-8 weeks after surgery. She should take 81 mg aspirin before her flight and after her flight. She does not need to take it until her trip since she is already past the 30 days.   She needs to be aware she is at an increased risk for a blood clot.   She should get up at least hourly to walk and do ROM of her her feet/legs, wear compression stockings, and hydrate.     Left voicemail informing patient of the above. Asked that she call back for further questions.     JAZZY Bergman RN

## 2023-04-23 ASSESSMENT — KOOS JR
STRAIGHTENING KNEE FULLY: MILD
TWISING OR PIVOTING ON KNEE: MILD
KOOS JR SCORING: 76.33
GOING UP OR DOWN STAIRS: MILD
HOW SEVERE IS YOUR KNEE STIFFNESS AFTER FIRST WAKING IN MORNING: MILD

## 2023-04-24 ENCOUNTER — TELEPHONE (OUTPATIENT)
Dept: ORTHOPEDICS | Facility: CLINIC | Age: 68
End: 2023-04-24
Payer: COMMERCIAL

## 2023-04-24 NOTE — TELEPHONE ENCOUNTER
Received PT Re-certification form from [facility]. Form needs provider review and signature.    Form placed in provider in-basket for review/completion.    Lilia Bowden MBA, ATC

## 2023-04-27 ENCOUNTER — TRANSFERRED RECORDS (OUTPATIENT)
Dept: HEALTH INFORMATION MANAGEMENT | Facility: CLINIC | Age: 68
End: 2023-04-27
Payer: COMMERCIAL

## 2023-04-28 ENCOUNTER — ANCILLARY PROCEDURE (OUTPATIENT)
Dept: GENERAL RADIOLOGY | Facility: CLINIC | Age: 68
End: 2023-04-28
Attending: STUDENT IN AN ORGANIZED HEALTH CARE EDUCATION/TRAINING PROGRAM
Payer: COMMERCIAL

## 2023-04-28 ENCOUNTER — OFFICE VISIT (OUTPATIENT)
Dept: ORTHOPEDICS | Facility: CLINIC | Age: 68
End: 2023-04-28
Payer: COMMERCIAL

## 2023-04-28 ENCOUNTER — TELEPHONE (OUTPATIENT)
Dept: ORTHOPEDICS | Facility: CLINIC | Age: 68
End: 2023-04-28

## 2023-04-28 VITALS — DIASTOLIC BLOOD PRESSURE: 80 MMHG | SYSTOLIC BLOOD PRESSURE: 125 MMHG

## 2023-04-28 DIAGNOSIS — Z96.651 STATUS POST UNICOMPARTMENTAL KNEE REPLACEMENT, RIGHT: Primary | ICD-10-CM

## 2023-04-28 DIAGNOSIS — M17.11 OSTEOARTHROSIS, LOCALIZED, PRIMARY, KNEE, RIGHT: ICD-10-CM

## 2023-04-28 DIAGNOSIS — M17.12 OSTEOARTHROSIS, LOCALIZED, PRIMARY, KNEE, LEFT: ICD-10-CM

## 2023-04-28 DIAGNOSIS — Z96.651 STATUS POST UNICOMPARTMENTAL KNEE REPLACEMENT, RIGHT: ICD-10-CM

## 2023-04-28 PROCEDURE — 73562 X-RAY EXAM OF KNEE 3: CPT | Mod: TC | Performed by: RADIOLOGY

## 2023-04-28 PROCEDURE — 99214 OFFICE O/P EST MOD 30 MIN: CPT | Mod: 24 | Performed by: STUDENT IN AN ORGANIZED HEALTH CARE EDUCATION/TRAINING PROGRAM

## 2023-04-28 RX ORDER — TRANEXAMIC ACID 650 MG/1
1950 TABLET ORAL ONCE
Status: CANCELLED | OUTPATIENT
Start: 2023-04-28 | End: 2023-04-28

## 2023-04-28 NOTE — PROGRESS NOTES
[facility]  Orthopaedic Surgery Consultation by Jose Juan Torrez M.D.    Layla Laazro MRN# [id number]   Age: 67 year old YOB: 1955     Requesting physician: Yecenia Marquez     Background history:  DX:  1. Hemochromatosis    TREATMENTS:  1. 3/14/2023, right knee unicompartmental arthroplasty, Dr. Torrez, [facility]           History of Present Illness:     68 year old female who presents to clinic today approximately 6 weeks status post right knee unicompartmental arthroplasty.  He is no longer experiencing pain.  Her residual discomfort is well controlled on a regimen of Tylenol and ibuprofen.   She made good progress in physical therapy.  She is ambulating with a cane.  She has completed her aspirin for DVT prophylaxis.  She denies any calf pain, shortness of breath or chest pain.    Patient would like to proceed with left medial partial unicondylar knee arthroplasty in September.  She will be traveling during the summer and may come in earlier for an intra-articular injection with a combination of lidocaine and cortisone.    Social:   Occupation: retired  Living situation: lives with spouse  Hobbies / Sports: walking    Smoking: No  Alcohol: Yes  Illicit drug use: No         Physical Exam:     EXAMINATION pertinent findings:   PSYCH: Pleasant, healthy-appearing, alert, oriented x3, cooperative. Normal mood and affect.  VITAL SIGNS: not currently breastfeeding.  Reviewed nursing intake notes.   There is no height or weight on file to calculate BMI.  RESP: non labored breathing   ABD: benign, soft, non-tender, no acute peritoneal findings  SKIN: grossly normal   LYMPHATIC: grossly normal, no adenopathy, no extremity edema  NEURO: grossly normal , no motor deficits  VASCULAR: satisfactory perfusion of all extremities   MUSCULOSKELETAL:   Alignment: Varus alignment  Gait: Normal gait.    R knee: The incision is clean dry and intact with no erythema,  dehiscence, discharge.  Diffuse ecchymosis noted along calf and foot.  Moderate joint effusion noted.  -0-0  .   Straight leg raise +.  Effusion Some.  Calves are soft nontender with negative Homans' sign    Right LE:   Thigh and leg compartments soft and compressible   +Quad/TA/GSC/FHL/EHL   SILT DP/SP/Sarah/Saph/Tib nerve distributions   Palpable dorsalis pedis pulse          Data:   All laboratory data reviewed  All imaging studies reviewed by me personally.    XR knee right 4/28/2023:  My interpretation: Status post placement of right medial unicondylar knee arthroplasty.  Adequate sizing, orientation and fixation of components.  No signs of immediate postoperative complications.           Assessment and Plan:   Assessment:  68-year-old female with history of chronic right knee pain due to end-stage osteoarthritic changes most notably in medial compartment who was insufficiently responding to nonsurgical treatment options therefore underwent a right knee unicompartmental arthroplasty on 3/14/2023.  Recovering appropriately     Plan:  I extensively discussed my findings with the patient.  Patient appears to be recovering appropriately.  Patient will continue to work with physical therapy on range of motion, strengthening and gait training.  All questions were answered.  Patient understands and agrees to the treatment plan as set forth.  We will follow-up with patient at the 1 year postoperative date with renewed radiographic imaging studies.     In regards to the left knee we had a discussion regarding ongoing management options.  Reviewed surgical and nonsurgical treatments.  The non-surgical options include activity modification, pain medication, PT, bracing and injection therapy. As for surgery we discussed the options of  partial and total knee replacement surgery. We reviewed partial and total knee replacement in detail including the procedure, the implants, the recovery process, and long-term outcomes.   We reviewed that the risks of the surgery include but are not limited to infection, wound problems, stiffness, persistent pain, swelling, clicking, loosening, revision surgery.  We also reviewed less common risks such as neurovascular injury fracture, and other implant-related issues.  We reviewed other medical complications such as a blood clot.  We discussed that the vast majority of cases have a highly successful outcome.   Based on a discussion of the risks and benefits patient  would like to proceed with left medial unicondylar versus left total knee replacement surgery.  We will work on scheduling surgery at a time that works well for patient closer to wards September.  Patient will contact us if there are any questions or concerns leading up to surgery. Before surgery the patient will attend a joint replacement class and will be seen by the PCP.     Patient will be candidate for same-day discharge surgery.    More information on joint replacements can be found on : [email or web address]      Jose Juan Torrez MD, PhD     Adult Reconstruction  [facility] Department of Orthopaedic Surgery

## 2023-04-28 NOTE — PATIENT INSTRUCTIONS
1. Status post unicompartmental knee replacement, right         Schedule left knee surgery.   Surgery Scheduler will contact you to assist with scheduling surgery.   You can contact her directly at [phone].   Prior to your scheduled surgery we advise scheduling with your dentist to obtain clearance for surgery, and to complete any recommended dental work prior.     Pre-operative Physical needed within 30 days of scheduled proceedure  Physical Therapy will be scheduled to start post op day .    For mor information regarding total joint surgery please visit our website:   [email or web address]    FORMS:   If you are needing any forms completed relating to your upcoming procedure, please send them to our office with a completed Release of Information.   Forms will be completed AFTER your procedure. A letter can be sent to your employer prior to surgery to inform them of your anticipated time off.    Please notify our staff if you would like a letter to do so.   Forms can be faxed directly to our clinic at [phone].     DO NOT BRING FORMS ON THE DATE OF SURGERY.         Follow up with Dr. Torrez 1 year for right knee for injection as needed    Call my office with any questions or concerns, [phone].

## 2023-04-28 NOTE — TELEPHONE ENCOUNTER
-Message was sent to Alyse Zurita (surgery scheduler) to:    1.  Schedule surgery for sometime in September.  2.  Mail the patient a surgery packet.  3.  Inform the patient that BERONICA Ahmadi will be calling her, closer to the surgery date to discuss pre-op teaching.    -A delayed message was also sent to Daiana Adame RN and CHAD Gonzalez (to be delivered sometime mid July, so they can call the patient and perform surgery teaching then).    Liz Derw RN  4/28/2023 2:21 PM

## 2023-04-28 NOTE — LETTER
4/28/2023         RE: Layla Lazaro  [address] [facility] [address]        Dear Colleague,    Thank you for referring your patient, Layla Lazaro, to the [facility] [place]. Please see a copy of my visit note below.        [facility]  Orthopaedic Surgery Consultation by Jose Juan Torrez M.D.    Layla Lazaro MRN# [id number]   Age: 67 year old YOB: 1955     Requesting physician: Yecenia Marquez     Background history:  DX:  1. Hemochromatosis    TREATMENTS:  1. 3/14/2023, right knee unicompartmental arthroplasty, Dr. Torrez, [facility]           History of Present Illness:     68 year old female who presents to clinic today approximately 6 weeks status post right knee unicompartmental arthroplasty.  He is no longer experiencing pain.  Her residual discomfort is well controlled on a regimen of Tylenol and ibuprofen.   She made good progress in physical therapy.  She is ambulating with a cane.  She has completed her aspirin for DVT prophylaxis.  She denies any calf pain, shortness of breath or chest pain.    Patient would like to proceed with left medial partial unicondylar knee arthroplasty in September.  She will be traveling during the summer and may come in earlier for an intra-articular injection with a combination of lidocaine and cortisone.    Social:   Occupation: retired  Living situation: lives with spouse  Hobbies / Sports: walking    Smoking: No  Alcohol: Yes  Illicit drug use: No         Physical Exam:     EXAMINATION pertinent findings:   PSYCH: Pleasant, healthy-appearing, alert, oriented x3, cooperative. Normal mood and affect.  VITAL SIGNS: not currently breastfeeding.  Reviewed nursing intake notes.   There is no height or weight on file to calculate BMI.  RESP: non labored breathing   ABD: benign, soft, non-tender, no acute peritoneal findings  SKIN: grossly normal   LYMPHATIC:  grossly normal, no adenopathy, no extremity edema  NEURO: grossly normal , no motor deficits  VASCULAR: satisfactory perfusion of all extremities   MUSCULOSKELETAL:   Alignment: Varus alignment  Gait: Normal gait.    R knee: The incision is clean dry and intact with no erythema, dehiscence, discharge.  Diffuse ecchymosis noted along calf and foot.  Moderate joint effusion noted.  -0-0  .   Straight leg raise +.  Effusion Some.  Calves are soft nontender with negative Homans' sign    Right LE:   Thigh and leg compartments soft and compressible   +Quad/TA/GSC/FHL/EHL   SILT DP/SP/Sarah/Saph/Tib nerve distributions   Palpable dorsalis pedis pulse          Data:   All laboratory data reviewed  All imaging studies reviewed by me personally.    XR knee right 4/28/2023:  My interpretation: Status post placement of right medial unicondylar knee arthroplasty.  Adequate sizing, orientation and fixation of components.  No signs of immediate postoperative complications.           Assessment and Plan:   Assessment:  68-year-old female with history of chronic right knee pain due to end-stage osteoarthritic changes most notably in medial compartment who was insufficiently responding to nonsurgical treatment options therefore underwent a right knee unicompartmental arthroplasty on 3/14/2023.  Recovering appropriately     Plan:  I extensively discussed my findings with the patient.  Patient appears to be recovering appropriately.  Patient will continue to work with physical therapy on range of motion, strengthening and gait training.  All questions were answered.  Patient understands and agrees to the treatment plan as set forth.  We will follow-up with patient at the 1 year postoperative date with renewed radiographic imaging studies.     In regards to the left knee we had a discussion regarding ongoing management options.  Reviewed surgical and nonsurgical treatments.  The non-surgical options include activity modification,  pain medication, PT, bracing and injection therapy. As for surgery we discussed the options of  partial and total knee replacement surgery. We reviewed partial and total knee replacement in detail including the procedure, the implants, the recovery process, and long-term outcomes.  We reviewed that the risks of the surgery include but are not limited to infection, wound problems, stiffness, persistent pain, swelling, clicking, loosening, revision surgery.  We also reviewed less common risks such as neurovascular injury fracture, and other implant-related issues.  We reviewed other medical complications such as a blood clot.  We discussed that the vast majority of cases have a highly successful outcome.   Based on a discussion of the risks and benefits patient  would like to proceed with left medial unicondylar versus left total knee replacement surgery.  We will work on scheduling surgery at a time that works well for patient closer to wards September.  Patient will contact us if there are any questions or concerns leading up to surgery. Before surgery the patient will attend a joint replacement class and will be seen by the PCP.     Patient will be candidate for same-day discharge surgery.    More information on joint replacements can be found on : [email or web address]      Jose Juan Torrez MD, PhD     Adult Reconstruction  [facility] Department of Orthopaedic Surgery        Again, thank you for allowing me to participate in the care of your patient.        Sincerely,        Jose Juan Torrez MD

## 2023-05-01 ENCOUNTER — TELEPHONE (OUTPATIENT)
Dept: ORTHOPEDICS | Facility: CLINIC | Age: 68
End: 2023-05-01
Payer: COMMERCIAL

## 2023-05-01 NOTE — TELEPHONE ENCOUNTER
Patient is scheduled for surgery with Dr. Torrez    Spoke with: Layla    Date of Surgery: 9/12/23    Location: [facility] OR    Informed patient they will need an adult  Yes    Pre op with Provider PAC, patient will call back to schedule.     Additional imaging/appointments: POP Made    Surgery packet: Refused--already had one from her other knee's surgery     Additional comments: N/A        Karolina Lee on 5/1/2023 at 3:19 PM

## 2023-05-02 NOTE — TELEPHONE ENCOUNTER
No consent to communicate, LVM for patient to call back. Scheduling number was given for call back.      Jarrod Alvarado ATC

## 2023-05-02 NOTE — TELEPHONE ENCOUNTER
Rescheduled  Patient is scheduled for surgery with Dr. Torrez     Spoke with: Layla     Date of Surgery: 9/19/23     Location: [facility] OR     Informed patient they will need an adult  Yes     Pre op with Provider PAC 8/21/23     Additional imaging/appointments: POP Made        Additional comments: Rescheduled per patient from 9/12/23 to 9/19/23. Patient requesting call back from care team.

## 2023-05-03 ENCOUNTER — TELEPHONE (OUTPATIENT)
Dept: ORTHOPEDICS | Facility: CLINIC | Age: 68
End: 2023-05-03
Payer: COMMERCIAL

## 2023-05-03 NOTE — TELEPHONE ENCOUNTER
Patient relayed the following questions for Dr. Torrez:   -Patient wanting to knee if she needs updated left knee x-rays prior to left uni knee surgery on 9/19/23. Last XR completed 12/9/22.  -Patient has routine dental cleaning 22 days prior to surgery. Please advise if that is an appropriate time frame.       Staff question for Hilary:   -Patient is scheduled for surgery on 9/19/23 but post op appointment is not until 10/13/23. Is there a reason the post op is 3.5 weeks post op rather than the normal 2 weeks post op? If needed, please reschedule so patient is seen in the appropriate time frame.     Linda Spain, ATC

## 2023-05-03 NOTE — TELEPHONE ENCOUNTER
Duplicate encounter. Please see encounter dated 5/1/23.     Linda Spain MSA, ATC  Certified Athletic Trainer

## 2023-05-03 NOTE — TELEPHONE ENCOUNTER
[facility] Call Center    Phone Message    May a detailed message be left on voicemail: yes     Reason for Call: Other: Pt is returning call to Jarrod she will be around all day today      Action Taken: [facility]     Travel Screening: Not Applicable

## 2023-05-04 ENCOUNTER — TELEPHONE (OUTPATIENT)
Dept: ORTHOPEDICS | Facility: CLINIC | Age: 68
End: 2023-05-04
Payer: COMMERCIAL

## 2023-05-04 NOTE — TELEPHONE ENCOUNTER
Patient informed that they will reach out to schedule her post op once the schedules are built out that far. All other questions regarding xrays, and dental treatment were answered. No other questions at this time.     Jarrod Alvarado ATC

## 2023-05-04 NOTE — TELEPHONE ENCOUNTER
Reason for call:  Pt returing VM. She had questions for the surgeon.    Phone number to reach patient:  Home number on file [phone] (home)    Best Time:  unknown    Can we leave a detailed message on this number?  unknown

## 2023-05-04 NOTE — TELEPHONE ENCOUNTER
Per Dr. Torrez: No updated x-rays are necessary.  The timing of her dental cleaning is very appropriate.     Linda Spain MSA, ATC  Certified Athletic Trainer

## 2023-05-04 NOTE — TELEPHONE ENCOUNTER
Left VM for patient to return call.     If patient returns call please inform her of message below from Dr. Torrez.     In regards to her post-operative appointment, the office will call her to get it rescheduled so that she is seen about 2 weeks post op. We do not have our physician assistants schedule build out that far at the moment but will have staff call her to schedule once Jt' schedule is secured for this fall.     Linda Spain, ATC

## 2023-05-05 NOTE — TELEPHONE ENCOUNTER
FUTURE VISIT INFORMATION      SURGERY INFORMATION:    Date: 9/19/23    Location:  OR    Surgeon:  Jose Juan Torrez MD    Anesthesia Type:  choice    Procedure: LEFT KNEE UNICOMPARTMENTAL ARTHROPLASTY POSSIBLE LEFT TOTAL ARTHROPLASTY    Consult: ov 4/28/23    RECORDS REQUESTED FROM:       Primary Care Provider: Yecenia Ricks MD    Most recent ECHO: 5/23/22

## 2023-05-05 NOTE — TELEPHONE ENCOUNTER
LVM for patient to return call to discuss questions. See below.     Linda Spain MSA, ATC  Certified Athletic Trainer

## 2023-05-16 ENCOUNTER — TELEPHONE (OUTPATIENT)
Dept: ORTHOPEDICS | Facility: CLINIC | Age: 68
End: 2023-05-16
Payer: COMMERCIAL

## 2023-05-16 DIAGNOSIS — M17.12 OSTEOARTHROSIS, LOCALIZED, PRIMARY, KNEE, LEFT: Primary | ICD-10-CM

## 2023-05-16 NOTE — TELEPHONE ENCOUNTER
[facility] Call Center    Phone Message:    Pt is requesting PRE-OP PT    Pt's request is for her LEFT knee    Surgery date (LEFT knee) is 9.19.2023    Pt wants to strengthen her LEFT knee, PRIOR to surgery     Reason for Call: Other: PT Order     Action Taken: Message routed to:  Other: [facility]    Travel Screening: Not Applicable

## 2023-05-16 NOTE — TELEPHONE ENCOUNTER
Phone call to patient. She states she wants to have physical therapy through a private office of [facility] in [place].   Please fax orders to attn: Rosenda Black [phone].     She is already being seen there for her right knee and needs new orders for her left knee. Will fax orders. She verbalized understanding and was appreciative of assistance.     Orders faxed. Confirmed they went through via [employer].     JAZZY Bergman RN

## 2023-05-16 NOTE — TELEPHONE ENCOUNTER
Please see request for pre operative physical therapy prior to L unicompartmental knee arthroplasty scheduled for 9/19/23 and advise.   Order pending.     JAZZY Bergman RN

## 2023-05-22 NOTE — TELEPHONE ENCOUNTER
forms completed and signed to desired location. [phone]. Copy sent to scan.    Jarrod Alvarado ATC

## 2023-06-19 ENCOUNTER — TRANSFERRED RECORDS (OUTPATIENT)
Dept: HEALTH INFORMATION MANAGEMENT | Facility: CLINIC | Age: 68
End: 2023-06-19
Payer: COMMERCIAL

## 2023-06-26 ENCOUNTER — TELEPHONE (OUTPATIENT)
Dept: ORTHOPEDICS | Facility: CLINIC | Age: 68
End: 2023-06-26
Payer: COMMERCIAL

## 2023-06-26 NOTE — TELEPHONE ENCOUNTER
Patients physical therapy discharge summary was received 6/26/2023 from [facility] for Dr. Torrez.     Deepa Fajardo

## 2023-07-11 NOTE — TELEPHONE ENCOUNTER
[facility] Call Center    Phone Message    May a detailed message be left on voicemail: yes     Reason for Call: Order(s): Other:   Reason for requested: Physical Therapy Order to [facility], phone# [phone] Fax# [phone]  Date needed: asap  Provider name: Jose Juan Torrez      Action Taken: Other: [facility]    Travel Screening: Not Applicable

## 2023-07-12 ENCOUNTER — TRANSFERRED RECORDS (OUTPATIENT)
Dept: HEALTH INFORMATION MANAGEMENT | Facility: CLINIC | Age: 68
End: 2023-07-12

## 2023-08-02 ENCOUNTER — TELEPHONE (OUTPATIENT)
Dept: ORTHOPEDICS | Facility: CLINIC | Age: 68
End: 2023-08-02
Payer: COMMERCIAL

## 2023-08-02 NOTE — TELEPHONE ENCOUNTER
Received fax from [facility] in [place] regarding patients initial examination on 07/12/2023. Fax was sent to scan.    Burton Astorga, Visit Facilitator

## 2023-08-04 ENCOUNTER — TELEPHONE (OUTPATIENT)
Dept: ORTHOPEDICS | Facility: CLINIC | Age: 68
End: 2023-08-04
Payer: COMMERCIAL

## 2023-08-04 DIAGNOSIS — M17.11 OSTEOARTHROSIS, LOCALIZED, PRIMARY, KNEE, RIGHT: Primary | ICD-10-CM

## 2023-08-04 DIAGNOSIS — M17.12 OSTEOARTHRITIS OF LEFT KNEE, UNSPECIFIED OSTEOARTHRITIS TYPE: ICD-10-CM

## 2023-08-04 NOTE — TELEPHONE ENCOUNTER
Attempted to phone patient to review preop teaching for upcoming left uni vs total knee replacement scheduled 9/19/23 with Dr. Torrez.    No answer during call, VM left with call back instructions.     Post op PT order entered today.    Daiana Adame RN on 8/4/2023 at 2:40 PM

## 2023-08-10 ENCOUNTER — TELEPHONE (OUTPATIENT)
Dept: ORTHOPEDICS | Facility: CLINIC | Age: 68
End: 2023-08-10
Payer: COMMERCIAL

## 2023-08-10 DIAGNOSIS — Z96.651 STATUS POST UNICOMPARTMENTAL KNEE REPLACEMENT, RIGHT: Primary | ICD-10-CM

## 2023-08-10 NOTE — TELEPHONE ENCOUNTER
[facility] Call Center    Phone Message    May a detailed message be left on voicemail: yes     Reason for Call: Other: Layla Chavarria has a dental appointment on 8/29 and is needing anti biotic before the appt. Can we please send it to [facility] [address]. Please call her once it is sent. Thank you, Tamara     Action Taken: Other: [facility]    Travel Screening: Not Applicable

## 2023-08-11 RX ORDER — AMOXICILLIN 500 MG/1
TABLET, FILM COATED ORAL
Qty: 4 TABLET | Refills: 4 | Status: SHIPPED | OUTPATIENT
Start: 2023-08-11 | End: 2023-09-15

## 2023-08-11 NOTE — TELEPHONE ENCOUNTER
Attempted to phone patient back regarding medication refill request.  No answer during call, VM left with call back instructions.     Request for dental abx was fulfilled.    Daiana Adame RN on 8/11/2023 at 8:20 AM

## 2023-08-17 NOTE — TELEPHONE ENCOUNTER
[facility] Call Center    Phone Message    May a detailed message be left on voicemail: Yes    Reason for Call: Pt is returning call to Daiana regarding surgery packets and questions she has. Would like a c/b    Action Taken: Message routed to: [facility]    Travel Screening: Not Applicable

## 2023-08-18 NOTE — TELEPHONE ENCOUNTER
Called pt to let her know that Daiana is out of office. Assisted with scheduling a 6 week post op.     She wants a new handicap form and wants to discuss pre op questions.     Hattie

## 2023-08-21 ENCOUNTER — LAB (OUTPATIENT)
Dept: LAB | Facility: CLINIC | Age: 68
End: 2023-08-21
Payer: COMMERCIAL

## 2023-08-21 ENCOUNTER — ANESTHESIA EVENT (OUTPATIENT)
Dept: SURGERY | Facility: CLINIC | Age: 68
End: 2023-08-21
Payer: COMMERCIAL

## 2023-08-21 ENCOUNTER — PRE VISIT (OUTPATIENT)
Dept: SURGERY | Facility: CLINIC | Age: 68
End: 2023-08-21

## 2023-08-21 ENCOUNTER — TELEPHONE (OUTPATIENT)
Dept: ORTHOPEDICS | Facility: CLINIC | Age: 68
End: 2023-08-21

## 2023-08-21 ENCOUNTER — OFFICE VISIT (OUTPATIENT)
Dept: SURGERY | Facility: CLINIC | Age: 68
End: 2023-08-21
Payer: COMMERCIAL

## 2023-08-21 VITALS
HEIGHT: 64 IN | HEART RATE: 63 BPM | DIASTOLIC BLOOD PRESSURE: 77 MMHG | OXYGEN SATURATION: 98 % | BODY MASS INDEX: 26.12 KG/M2 | RESPIRATION RATE: 16 BRPM | SYSTOLIC BLOOD PRESSURE: 112 MMHG | WEIGHT: 153 LBS | TEMPERATURE: 98.4 F

## 2023-08-21 DIAGNOSIS — Z01.818 PRE-OP EVALUATION: ICD-10-CM

## 2023-08-21 DIAGNOSIS — Z01.818 PRE-OP EVALUATION: Primary | ICD-10-CM

## 2023-08-21 LAB
ANION GAP SERPL CALCULATED.3IONS-SCNC: 9 MMOL/L (ref 7–15)
BUN SERPL-MCNC: 14.3 MG/DL (ref 8–23)
CALCIUM SERPL-MCNC: 9.7 MG/DL (ref 8.8–10.2)
CHLORIDE SERPL-SCNC: 104 MMOL/L (ref 98–107)
CREAT SERPL-MCNC: 0.62 MG/DL (ref 0.51–0.95)
DEPRECATED HCO3 PLAS-SCNC: 27 MMOL/L (ref 22–29)
ERYTHROCYTE [DISTWIDTH] IN BLOOD BY AUTOMATED COUNT: 11.8 % (ref 10–15)
GFR SERPL CREATININE-BSD FRML MDRD: >90 ML/MIN/1.73M2
GLUCOSE SERPL-MCNC: 89 MG/DL (ref 70–99)
HCT VFR BLD AUTO: 44.1 % (ref 35–47)
HGB BLD-MCNC: 14.8 G/DL (ref 11.7–15.7)
MCH RBC QN AUTO: 31.6 PG (ref 26.5–33)
MCHC RBC AUTO-ENTMCNC: 33.6 G/DL (ref 31.5–36.5)
MCV RBC AUTO: 94 FL (ref 78–100)
PLATELET # BLD AUTO: 251 10E3/UL (ref 150–450)
POTASSIUM SERPL-SCNC: 4.2 MMOL/L (ref 3.4–5.3)
RBC # BLD AUTO: 4.68 10E6/UL (ref 3.8–5.2)
SODIUM SERPL-SCNC: 140 MMOL/L (ref 136–145)
WBC # BLD AUTO: 7.1 10E3/UL (ref 4–11)

## 2023-08-21 PROCEDURE — 99214 OFFICE O/P EST MOD 30 MIN: CPT | Performed by: NURSE PRACTITIONER

## 2023-08-21 PROCEDURE — 80048 BASIC METABOLIC PNL TOTAL CA: CPT | Performed by: PATHOLOGY

## 2023-08-21 PROCEDURE — 36415 COLL VENOUS BLD VENIPUNCTURE: CPT | Performed by: PATHOLOGY

## 2023-08-21 PROCEDURE — 85027 COMPLETE CBC AUTOMATED: CPT | Performed by: PATHOLOGY

## 2023-08-21 RX ORDER — TRAZODONE HYDROCHLORIDE 50 MG/1
50 TABLET, FILM COATED ORAL
COMMUNITY
Start: 2023-03-31 | End: 2023-09-15

## 2023-08-21 RX ORDER — TRAMADOL HYDROCHLORIDE 50 MG/1
TABLET ORAL
COMMUNITY
Start: 2023-03-31 | End: 2023-08-21

## 2023-08-21 ASSESSMENT — PAIN SCALES - GENERAL: PAINLEVEL: MILD PAIN (2)

## 2023-08-21 ASSESSMENT — LIFESTYLE VARIABLES: TOBACCO_USE: 1

## 2023-08-21 ASSESSMENT — ENCOUNTER SYMPTOMS: ORTHOPNEA: 0

## 2023-08-21 NOTE — PATIENT INSTRUCTIONS
Name:  Layla Lazaro   MRN:  [id number]   :  1955   Today's Date:  2023         You were seen today for a pre-operative assessment in the:    Pre-operative Anesthesia Assessment Center(PAC)  [facility]  [address] [place] [id number]  phone [phone]      You will be receiving a call with location, date, arrival time and diet instructions from Preadmission Nursing at your surgical site:    -[facility]: [phone]   -[facility]: [phone]  -[facility]: [phone]  -[facility]: [phone]  -[facility]: [phone]  -[facility]: [phone]        Anesthesia recommendations for medications:    Hold Aspirin for 7 days before procedure.  Hold Multivitamins for 7 days before procedure.   Hold Herbal medications and Supplements for 7 days before procedure.  Hold Ibuprofen for 1 day before procedure.   Hold Naproxen for 4 days before procedure.     No alcohol or cannabis products for 24 hours before your procedure         Please DO NOT take the following medications the day of procedure:    Clobetasol (Temovate) cream  Vitamin B-12  Vitamin D3    Please take these medications the day of procedure:    Tylenol if needed      How do I prepare myself?  - Please take 2 showers (one the night prior to surgery and one the morning of surgery) using Scrubcare or Hibiclens soap.    Use this soap only from the neck to your toes.     Leave the soap on your skin for one minute--then rinse thoroughly.      You may use your own shampoo and conditioner. No other hair products.   - Please remove all jewelry and body piercings.  - No lotions, deodorants or fragrance.  - No makeup or fingernail polish.   - Bring your ID and insurance card.    -If you have a Deep Brain Stimulator, a Spinal Cord Stimulator, or any implanted Neuro Device, you must bring the remote to your appointment         OPTIMAL RECOVERY AFTER SURGERY -  start this the day before surgery       Begin hydrating yourself by drinking at least 8-10 glasses of clear liquids for 24 hours before surgery:      Suggested clear liquids:   Water    Clear Juices   Clear Broth   Non- carbonated beverages    (Crystal Light or Juaquin Aid)   Sodas    (Sprite, 7 up, ginger ale, seltzer)   Gatorade         Drink clear liquids up until 4 hours before your surgery.       We would like you to purchase a drink such as Gatorade or Ensure Clear (not the milkshake type).  Drink this before bedtime and the morning of surgery drink between 8-10 ounces or until you feel hydrated.        Keeping well hydrated leads to your veins being plump, you wake up faster, and you are less likely to be nauseated. Start drinking water as soon as you can after surgery and advance to clear liquids and food as tolerated.  IV fluids contain salt, drinking fluids will minimize the amount of IV fluids you need and decrease the amount of salt you get.                 The most common reason for the patient to be readmitted is dehydration. Staying hydrated after you go home from the hospital is very important.  Ensure or Ensure Clear are good options to keep you hydrated.        For further questions regarding your surgery please call your surgeon's office.

## 2023-08-21 NOTE — TELEPHONE ENCOUNTER
Reason for Call:  Form, our goal is to have forms completed with 7 days, however, some forms may require a visit or additional information.    Type of letter, form or note:   Handicapped Parking Certificate      Who is the form from?: Patient    Phone number of person requesting form: [phone]   Can we leave a detailed message on this number:  YES    Desired completion date of form: patient is scheduled for surgery  9/19/23    How will form be returned?:  picked up in clinic.     Form was started and place in Provider Basket for provider review/ completion at [facility].     JAZZY Bergman RN

## 2023-08-21 NOTE — TELEPHONE ENCOUNTER
Attempted to phone patient to address surgery questions.  No answer during call, VM left with call back info.    Daiana Adame RN on 8/21/2023 at 9:58 AM

## 2023-08-21 NOTE — H&P
Pre-Operative H & P     CC:  Preoperative exam to assess for increased cardiopulmonary risk while undergoing surgery and anesthesia.    Date of Encounter: 8/21/2023  Primary Care Physician:  Yecenia Ricks     Reason for visit: Pre-operative evaluation      HPI  Layla Lazaro is a 68 year old female who presents for pre-operative H & P in preparation for  Procedure Information       Case: [id number] Date/Time: 09/19/23 0845    Procedures:       LEFT KNEE UNICOMPARTMENTAL ARTHROPLASTY (Left: Knee)      POSSIBLE LEFT TOTAL ARTHROPLASTY (Left: Knee)    Anesthesia type: Choice    Diagnosis: Osteoarthrosis, localized, primary, knee, left [M17.12]    Pre-op diagnosis: Osteoarthrosis, localized, primary, knee, left [M17.12]    Location:  OR  /  OR    Providers: Jose Juan Torrez MD            Layla Lazaro is a 68-year-old female with a PMH significant for Moderate AS, HL, hemochromatosis (donates blood every 3 months) allergic rhinitis and OA bilateral knees, s/p right knee unicompartmental arthroplasty 3/14/2023. She has done well since with improvement in symptoms with her right knee. She continues to have ongoing symptoms in her left knee and has met with Dr. Torrez and presents today in preparation for the above recommended procedure.      History is obtained from the patient and chart review    Hx of abnormal bleeding or anti-platelet use: no    Menstrual history: No LMP recorded (lmp unknown). Patient is postmenopausal.:      Past Medical History  Past Medical History:   Diagnosis Date    Colon polyps     Heart murmur     Hemochromatosis     gives blood every 3 mo       Past Surgical History  Past Surgical History:   Procedure Laterality Date    ARTHROPLASTY KNEE UNICOMPARTMENT Right 3/14/2023    Procedure: Right knee unicompartmental arthroplasty;  Surgeon: Jose Juan Torrez MD;  Location: [facility]    COLONOSCOPY      EYE SURGERY      as child    MOUTH SURGERY      wisdom teeth age 17        Prior to Admission Medications  Current Outpatient Medications   Medication Sig Dispense Refill    acetaminophen (TYLENOL) 500 MG tablet Take 500 mg by mouth 2 times daily as needed for mild pain      clobetasol (TEMOVATE) 0.05 % external ointment Apply topically as needed      Cyanocobalamin (VITAMIN B 12) 100 MCG LOZG Take 100 mcg by mouth every morning      Vitamin D3 (CHOLECALCIFEROL) 25 mcg (1000 units) tablet Take 25 mcg by mouth every morning      amoxicillin (AMOXIL) 500 MG tablet Administer 4 tablets (2,000 mg), 1 hour prior to dental visits and/or non-sterile procedures (Patient not taking: Reported on 2023) 4 tablet 4    fexofenadine (ALLEGRA) 180 MG tablet Take 180 mg by mouth every morning (Patient not taking: Reported on 2023)      oxyCODONE (ROXICODONE) 5 MG tablet Take 1-2 tablets (5-10 mg) by mouth every 4 hours as needed for moderate to severe pain 26 tablet 0    polyethylene glycol (MIRALAX) 17 g packet Take 17 g by mouth daily 7 packet 0    senna-docusate (SENOKOT-S/PERICOLACE) 8.6-50 MG tablet Take 1-2 tablets by mouth 2 times daily Take while on oral narcotics to prevent or treat constipation. 30 tablet 0    simvastatin (ZOCOR) 40 MG tablet Take 40 mg by mouth every morning (Patient not taking: Reported on 2023)         Allergies  No Known Allergies    Social History  Social History     Socioeconomic History    Marital status:      Spouse name: Not on file    Number of children: Not on file    Years of education: Not on file    Highest education level: Not on file   Occupational History    Not on file   Tobacco Use    Smoking status: Former     Types: Cigarettes     Quit date:      Years since quittin.6    Smokeless tobacco: Never   Substance and Sexual Activity    Alcohol use: Not Currently    Drug use: Never    Sexual activity: Not on file   Other Topics Concern    Parent/sibling w/ CABG, MI or angioplasty before 65F 55M? Not Asked   Social History  "Narrative    Not on file     Social Determinants of Health     Financial Resource Strain: Not on file   Food Insecurity: Not on file   Transportation Needs: Not on file   Physical Activity: Not on file   Stress: Not on file   Social Connections: Not on file   Intimate Partner Violence: Not on file   Housing Stability: Not on file       Family History  Family History   Problem Relation Age of Onset    Colon Cancer Father        Review of Systems  The complete review of systems is negative other than noted in the HPI or here.   Anesthesia Evaluation   Pt has had prior anesthetic. Type: General.    No history of anesthetic complications       ROS/MED HX  ENT/Pulmonary:     (+)           allergic rhinitis,     tobacco use, Past use,                      Neurologic:  - neg neurologic ROS     Cardiovascular:     (+) Dyslipidemia - -   -  - -                           valvular problems/murmurs type: AS Moderate Mean gradient 20 BOO 1.4cm.    Previous cardiac testing   Echo: Date: 5/2022 Results:    Stress Test:  Date: Results:    ECG Reviewed:  Date: Results:    Cath:  Date: Results:   (-) ATWOOD, orthopnea/PND and syncope   METS/Exercise Tolerance: >4 METS Comment: Previously able to walk 3-5 miles due to knee is walking 2 miles daily.    Hematologic: Comments: Hemochromatosis- gives blood Q3 months      Musculoskeletal: Comment: OA bilateral knees, s/p right knee unicompartmental arthroplasty 3/14/2023  (+)  arthritis,             GI/Hepatic:  - neg GI/hepatic ROS     Renal/Genitourinary:  - neg Renal ROS     Endo:  - neg endo ROS     Psychiatric/Substance Use:  - neg psychiatric ROS     Infectious Disease:  - neg infectious disease ROS     Malignancy:  - neg malignancy ROS     Other:            /77 (BP Location: Right arm, Patient Position: Sitting, Cuff Size: Adult Regular)   Pulse 63   Temp 98.4  F (36.9  C) (Oral)   Resp 16   Ht 1.626 m (5' 4\")   Wt 69.4 kg (153 lb)   LMP  (LMP Unknown)   SpO2 98%   " Breastfeeding No   BMI 26.26 kg/m      Physical Exam   Constitutional: Awake, alert, cooperative, no apparent distress, and appears stated age.  Eyes: Pupils equal, round and reactive to light, extra ocular muscles intact, sclera clear, conjunctiva normal.  HENT: Normocephalic, oral pharynx with moist mucus membranes, good dentition. No goiter appreciated.   Respiratory: Clear to auscultation bilaterally, no crackles or wheezing.  Cardiovascular: Regular rate and rhythm, normal S1 and S2, and no murmur noted.  Carotids +2, no bruits. No edema. Palpable pulses to radial  DP and PT arteries.   GI: Normal bowel sounds, soft, non-distended, non-tender, no masses palpated, no hepatosplenomegaly.  Surgical scars: healed  Lymph/Hematologic: No cervical lymphadenopathy and no supraclavicular lymphadenopathy.  Genitourinary:  deferred  Skin: Warm and dry.  No rashes at anticipated surgical site.   Musculoskeletal: Full ROM of neck. There is no redness, warmth, or swelling of the joints. Gross motor strength is normal.    Neurologic: Awake, alert, oriented to name, place and time. Cranial nerves II-XII are grossly intact. Gait is normal.   Neuropsychiatric: Calm, cooperative. Normal affect.     Prior Labs/Diagnostic Studies   All labs and imaging personally reviewed   Lab Results   Component Value Date    WBC 7.6 02/27/2023    WBC 8.5 12/28/2010     Lab Results   Component Value Date    RBC 4.86 02/27/2023    RBC 4.67 12/28/2010     Lab Results   Component Value Date    HGB 15.5 02/27/2023    HGB 15.5 12/28/2010     Lab Results   Component Value Date    HCT 45.6 02/27/2023    HCT 44.3 12/28/2010     No components found for: MCT  Lab Results   Component Value Date    MCV 94 02/27/2023    MCV 95 12/28/2010     Lab Results   Component Value Date    MCH 31.9 02/27/2023    MCH 33.2 12/28/2010     Lab Results   Component Value Date    MCHC 34.0 02/27/2023    MCHC 35.0 12/28/2010     Lab Results   Component Value Date    RDW 12.0  02/27/2023    RDW 12.1 12/28/2010     Lab Results   Component Value Date     02/27/2023     12/28/2010     Last Comprehensive Metabolic Panel:  Lab Results   Component Value Date     02/27/2023    POTASSIUM 3.8 02/27/2023    CHLORIDE 104 02/27/2023    CO2 25 02/27/2023    ANIONGAP 10 02/27/2023    GLC 89 02/27/2023    BUN 16.7 02/27/2023    CR 0.57 02/27/2023    GFRESTIMATED >90 02/27/2023    DANYEL 9.7 02/27/2023             EKG/ stress test - if available please see in ROS above   5/2022  Echo result w/o MOPS: Interpretation Summary Left ventricular size, global systolic function, and wall motion are normal,estimated LVEF 60-65%.Right ventricular global function is normal.Moderate aortic stenosis, Vmax 3.1 m/s, mean gradient 20 mmHg, BOO 1.4cm2, DI0.46. SVi 54 cc/m2. There are no prior studies available for comparison.        The patient's records and results personally reviewed by this provider.     Outside records reviewed from: Care Everywhere    LAB/DIAGNOSTIC STUDIES TODAY:    Lab Results   Component Value Date    WBC 7.1 08/21/2023    WBC 8.5 12/28/2010     Lab Results   Component Value Date    RBC 4.68 08/21/2023    RBC 4.67 12/28/2010     Lab Results   Component Value Date    HGB 14.8 08/21/2023    HGB 15.5 12/28/2010     Lab Results   Component Value Date    HCT 44.1 08/21/2023    HCT 44.3 12/28/2010     No components found for: MCT  Lab Results   Component Value Date    MCV 94 08/21/2023    MCV 95 12/28/2010     Lab Results   Component Value Date    MCH 31.6 08/21/2023    MCH 33.2 12/28/2010     Lab Results   Component Value Date    MCHC 33.6 08/21/2023    MCHC 35.0 12/28/2010     Lab Results   Component Value Date    RDW 11.8 08/21/2023    RDW 12.1 12/28/2010     Lab Results   Component Value Date     08/21/2023     12/28/2010     Last Comprehensive Metabolic Panel:  Lab Results   Component Value Date     08/21/2023    POTASSIUM 4.2 08/21/2023    CHLORIDE 104  08/21/2023    CO2 27 08/21/2023    ANIONGAP 9 08/21/2023    GLC 89 08/21/2023    BUN 14.3 08/21/2023    CR 0.62 08/21/2023    GFRESTIMATED >90 08/21/2023    DANYLE 9.7 08/21/2023             Assessment    Layla Lazaro is a 68 year old female seen as a PAC referral for risk assessment and optimization for anesthesia.    Plan/Recommendations  Pt will be optimized for the proposed procedure.  See below for details on the assessment, risk, and preoperative recommendations    NEUROLOGY  - No history of TIA, CVA or seizure  - Chronic Pain  On chronic opiates, morphine equivilant = 45-90 MME/Day   Please access the PAC pharmacist's note for full details.  -Post Op delirium risk factors:  No risk identified    ENT  - No current airway concerns.  Will need to be reassessed day of surgery.  Mallampati: II  TM: > 3    CARDIAC  - No history of CAD, Hypertension and Afib   No anginal symptoms, Denies palpitations, PND, dizziness or syncope.   - Hyperlipidemia  Well controlled on home regimen  - Aortic Stenosis- Moderate mean gradient 20, BOO 1.4cm - asymptomatic ( echo 5/2022)  - 2/6 murmur on exam.   - METS (Metabolic Equivalents) walks 2 miles daily and is continues with Home PT exercises  Patient performs 4 or more METS exercise without symptoms            Total Score: 0      RCRI-Very low risk: Class 1 0.4% complication rate            Total Score: 0        PULMONARY    DINA Low Risk            Total Score: 1    DINA: Over 50 ys old      - Denies asthma or inhaler use  - Tobacco History    History   Smoking Status    Former    Types: Cigarettes    Quit date: 1992   Smokeless Tobacco    Never       GI    PONV High Risk  Total Score: 3           1 AN PONV: Pt is Female    1 AN PONV: Patient is not a current smoker    1 AN PONV: Intended Post Op Opioids        /RENAL  - Baseline Creatinine  WNL    ENDOCRINE    - BMI: Estimated body mass index is 26.26 kg/m  as calculated from the following:    Height as of this  "encounter: 1.626 m (5' 4\").    Weight as of this encounter: 69.4 kg (153 lb).        HEME  VTE Low Risk 0.26%            Total Score: 1    VTE: Greater than 59 yrs old      Hemochromatosis- gives blood Q3 -4 months  Hgb average 15-15.5    MSK  Patient is NOT Frail            Total Score: 0      Arthritis  -OA bilateral knees, s/p right knee unicompartmental arthroplasty 3/14/2023  Procedure scheduled as above.   - ORAS initiated      Different anesthesia methods/types have been discussed with the patient, but they are aware that the final plan will be decided by the assigned anesthesia provider on the date of service.      The patient is optimized for their procedure. AVS with information on surgery time/arrival time, meds and NPO status given by nursing staff. No further diagnostic testing indicated.      On the day of service:     Prep time: 10 minutes  Visit time: 15 minutes  Documentation time: 10 minutes  ------------------------------------------  Total time: 35 minutes      MATTHEW Manning CNP  Preoperative Assessment Center  [facility]  [facility]  Phone: [phone]  Fax: [phone]    "

## 2023-08-21 NOTE — TELEPHONE ENCOUNTER
Phoned patient back regarding surgery questions.    She is scheduled for uni vs total replacement surgery on 9/19 with Dr. Torrez.    Patient wanted to make sure she is scheduled for a fast-track as discussed during her last clinic appt with Dr. Torrez.  Writer will connect with surgery scheduling team to make sure it was scheduled accordingly.    Patient also requests a handicap sticker be sent to her in the mail and if possible she wants nurse, Tamara at [facility] caring for her.  Writer will pass along requests.    Daiana Adame RN on 8/21/2023 at 3:06 PM

## 2023-08-21 NOTE — PROVIDER NOTIFICATION
08/21/23 1349   Discharge Planning   Patient/Family Anticipates Transition to home   Concerns to be Addressed all concerns addressed in this encounter   Living Arrangements   People in Home spouse   Type of Residence Private Residence   Is your private residence a single family home or apartment? Single family home   Number of Stairs, Within Home, Primary six   Stair Railings, Within Home, Primary railings on both sides of stairs   Once home, are you able to live on one level? Yes   Which level? Main Level   Bathroom Shower/Tub Tub only  (has tub bench but plans to sponge bathe for first two weeks)   Equipment Currently Used at Home raised toilet seat;tub bench;grab bar, toilet;walker, adilene   Support System   Support Systems Spouse/Significant Other   Do you have someone available to stay with you one or two nights once you are home? Yes   Medical Clearance   Date of Physical 08/21/23   Clinic Name pac   It is recommended that you call and check with any specialty providers before surgery to see if you need surgical clearance.  Do you see any specialty providers outside of your primary care provider? No   Blood   Known Bleeding Disorder or Coagulopathy No   Does the patient have any Zoroastrianism/cultural preferences related to blood products? No   Education   Has the patient scheduled or completed pre-op total joint education, either in class or online, in the last 12 months? Yes  (previous surgery. Will read booklet again but no class)   Patient attended total joint pre-op class/received pre-op teaching  online

## 2023-08-21 NOTE — TELEPHONE ENCOUNTER
[facility] Call Center    Phone Message    May a detailed message be left on voicemail: yes     Reason for Call: Patient missed call. Please Call Back. Patient has Questions about Surgery     Action Taken: Message routed to:  [facility] ([facility]): [email or web address]    Travel Screening: Not Applicable

## 2023-08-23 NOTE — TELEPHONE ENCOUNTER
Duplicate encounter. Please see telephone encounter dated 8/4/21. Form is being mailed to patient from the [facility].     Catherine Bergman RN on 8/23/2023 at 11:28 AM

## 2023-08-31 ENCOUNTER — TELEPHONE (OUTPATIENT)
Dept: ORTHOPEDICS | Facility: CLINIC | Age: 68
End: 2023-08-31
Payer: COMMERCIAL

## 2023-08-31 NOTE — TELEPHONE ENCOUNTER
[facility] Call Center    Phone Message    May a detailed message be left on voicemail: yes     Reason for Call: Other: Patient had dental appt this week, in XR they saw possible infection. She is set to see an Orthodontic provider on 9/6 to check on the infection and if there is a need they can fix within a day or 2 of the consultation, will have more information at that point. Wanted to make Dr. Torrez aware as she has surgery scheduled on 9/19/23     Action Taken: Other: Wu    Travel Screening: Not Applicable

## 2023-09-01 NOTE — TELEPHONE ENCOUNTER
Spoke with Layla, she has a possible dental infection that showed up on x-ray. She is going to follow up with dental on 9/6 about next steps. Writer educated patient on the risks of dental infections in regards to joint replacements.  Writer advised that dental infections and procedures must be addressed prior to surgery.      She will plan to phone me after her appt with the dentist on 9/6 to discuss next steps. Writer advised we may have to postpone surgery until dental concerns are fully addressed.    Daiana Adame RN on 9/1/2023 at 3:20 PM

## 2023-09-01 NOTE — TELEPHONE ENCOUNTER
Attempted to phone patient regarding dental concerns with her upcoming surgery with Dr. Torrez.  No answer during call, writer left  with callback instructions to discuss.    Daiana Adame RN on 9/1/2023 at 12:49 PM

## 2023-09-01 NOTE — TELEPHONE ENCOUNTER
Please see below and let us know if you have any concerns about the patient proceeding with upcoming surgery on 9/19/23.    Erin Barreto, MEDINA, LAT, ATC

## 2023-09-06 ENCOUNTER — TELEPHONE (OUTPATIENT)
Dept: ORTHOPEDICS | Facility: CLINIC | Age: 68
End: 2023-09-06
Payer: COMMERCIAL

## 2023-09-06 NOTE — TELEPHONE ENCOUNTER
[facility] Call Center    Phone Message    May a detailed message be left on voicemail: yes     Reason for Call: Other: Patient called in to let you know that she is having a Root Canal on either 9/7 or 9/8 and the Dentist wants to confirm she can still have surgery.  Can you email or fax form over to the Dentist for them to fill out so patient can still have surgery.  Email is:  [email or web address], Office number is [phone], and Fax is [phone].  Thank you     Action Taken: Other: [id number]    Travel Screening: Not Applicable

## 2023-09-08 NOTE — TELEPHONE ENCOUNTER
Attempted to phone patient to follow up in regards to dental work before surgery.  No answer during call, VM left with call back information.    Daiana Adame RN on 9/8/2023 at 10:34 AM

## 2023-09-11 ENCOUNTER — TELEPHONE (OUTPATIENT)
Dept: ORTHOPEDICS | Facility: CLINIC | Age: 68
End: 2023-09-11
Payer: COMMERCIAL

## 2023-09-11 NOTE — TELEPHONE ENCOUNTER
Phoned patient back regarding her dental clearance for upcoming surgery.    Patient reports she had a root canal on 9/8.  Unfortunately a stitch came out and is wondering if this will affect her dental clearance.  Patient reports she attempted to call her dentist, but immediately got an out of office message, dentist is out of office until Wednesday.  Writer instructed to look at her discharge paperwork and she found an alternative number she can try to call to get clarity.    Patient will phone writer once she gets clarity from her dentist about the stitch that has fallen out.  Writer will fax dental clearance for surgery.    Daiana Adame RN on 9/11/2023 at 12:39 PM

## 2023-09-11 NOTE — TELEPHONE ENCOUNTER
Patient calling back with dental update and questions.   Please also see encounters dated 9/6/23 and 8/31/23.     JAZZY Bergman RN

## 2023-09-11 NOTE — TELEPHONE ENCOUNTER
Patient Returning Call    Reason for call:  patient has upcoming surgery with provider and has several questions, patient had dental work done 9/8 and form needed to be sent to dentist, wanting to speak to providers team, requesting callback     Information relayed to patient:  te sent to clinic     Patient has additional questions:  No      Could we [facility] this [facility] or would you prefer to receive a phone call?:   Patient would prefer a phone call   Okay to leave a detailed message?: Yes at Cell number on file:    Telephone Information:   Mobile [phone]

## 2023-09-13 NOTE — TELEPHONE ENCOUNTER
Received call from [facility]. He states patient was seen this am for a loose suture and is refusing to leave until a form is fax to them to clear her for surgery. He states patient has been cleared for surgery and that the suture is dissolvable. Their office was closed the past two days, and patient came to their clinic today to be evaluated.     No form has been faxed yet because patient was to contact us first.     Faxed Dental Clearance Form to: [phone]. Thomas stayed on the phone until the fax was received by their office. Asked that he fax it back to the [place] location attn: Dr. Torrez. He verbalized understanding and was appreciative of assistance.     JAZZY Bergman RN

## 2023-09-13 NOTE — PROGRESS NOTES
Ortho Navigator Note      Pre-op Date 8/21/23     Medical Clearance  Cleared     Labs WNR     COVID Test Date No longer indicated      Skin  Intact      Activity: Ambulates independently without assistive device     Equipment Need Patient will likely need a walker for discharge. Defer to PT/OT for recs.      Meds to Hold Held all supplements 14 days prior to surgery  Please DO NOT take the following medications the day of procedure:  Clobetasol (Temovate) cream  Vitamin B-12  Vitamin D3    * Medication recommendations are not intended to be exhaustive; they are limited to common medications that are potentially dangerous if incorrectly managed   NPO Instructions  Defer to PAN RN     Pre-op Joint Education Complete? Completed in previous 6 months.    Discharge Plan Patient has plan to discharge home on morning of POD 1.    will remain at hospital during surgery to participate in therapy and discharge education. They will then transport patient home    /Transportation  will be  and transportation.  is physically able to care for patient.      Barriers to Discharge No barriers to discharge.      Additional Info/   Special Needs : Patient had a tooth infection and root canal on 9/8/23. She will be done with amoxicillin on 9/15/23. Ortho aware.           08/21/23 1349   Discharge Planning   Patient/Family Anticipates Transition to home   Concerns to be Addressed all concerns addressed in this encounter   Living Arrangements   People in Home spouse   Type of Residence Private Residence   Is your private residence a single family home or apartment? Single family home   Number of Stairs, Within Home, Primary six   Stair Railings, Within Home, Primary railings on both sides of stairs   Once home, are you able to live on one level? Yes   Which level? Main Level   Bathroom Shower/Tub Tub only  (has tub bench but plans to sponge bathe for first two weeks)   Equipment Currently Used at Home raised  toilet seat;tub bench;grab bar, toilet;walker, adilene   Support System   Support Systems Spouse/Significant Other   Do you have someone available to stay with you one or two nights once you are home? Yes   Medical Clearance   Date of Physical 08/21/23   Clinic Name pac   It is recommended that you call and check with any specialty providers before surgery to see if you need surgical clearance.  Do you see any specialty providers outside of your primary care provider? No   Blood   Known Bleeding Disorder or Coagulopathy No   Does the patient have any Taoism/cultural preferences related to blood products? No   Education   Has the patient scheduled or completed pre-op total joint education, either in class or online, in the last 12 months? Yes  (previous surgery. Will read booklet again but no class)   Patient attended total joint pre-op class/received pre-op teaching  online   Relationship/Living Environment   Name(s) of People in Home  phoebe be .

## 2023-09-15 NOTE — PHARMACY-ADMISSION MEDICATION HISTORY
Medication history and patient interview completed by pre-admitting nurse. Reviewed by pharmacist. No further clarifications needed.    Dilan Carrington RPh  ------------------------------------------------------  Status Changed by Time of change   Nurse Complete Elgin Nunez RN Mon Aug 21, 2023  1:36 PM     Prior to Admission medications    Medication Sig Last Dose Taking? Auth Provider Long Term End Date   acetaminophen (TYLENOL) 500 MG tablet Take 500 mg by mouth 2 times daily as needed for mild pain  Yes Reported, Patient     clobetasol (TEMOVATE) 0.05 % external ointment Apply topically as needed  Yes Reported, Patient     Cyanocobalamin (VITAMIN B 12) 100 MCG LOZG Take 100 mcg by mouth every morning  Yes Reported, Patient     simvastatin (ZOCOR) 40 MG tablet Take 40 mg by mouth every morning  Yes Reported, Patient Yes    vitamin B-Complex Take 1 tablet by mouth every 7 days  Yes Reported, Patient     Vitamin D3 (CHOLECALCIFEROL) 25 mcg (1000 units) tablet Take 25 mcg by mouth every morning  Yes Reported, Patient

## 2023-09-19 ENCOUNTER — ANESTHESIA (OUTPATIENT)
Dept: SURGERY | Facility: CLINIC | Age: 68
End: 2023-09-19
Payer: COMMERCIAL

## 2023-09-19 ENCOUNTER — APPOINTMENT (OUTPATIENT)
Dept: PHYSICAL THERAPY | Facility: CLINIC | Age: 68
End: 2023-09-19
Attending: STUDENT IN AN ORGANIZED HEALTH CARE EDUCATION/TRAINING PROGRAM
Payer: COMMERCIAL

## 2023-09-19 ENCOUNTER — HOSPITAL ENCOUNTER (OUTPATIENT)
Facility: CLINIC | Age: 68
Discharge: HOME | End: 2023-09-19
Attending: STUDENT IN AN ORGANIZED HEALTH CARE EDUCATION/TRAINING PROGRAM | Admitting: STUDENT IN AN ORGANIZED HEALTH CARE EDUCATION/TRAINING PROGRAM
Payer: COMMERCIAL

## 2023-09-19 ENCOUNTER — APPOINTMENT (OUTPATIENT)
Dept: GENERAL RADIOLOGY | Facility: CLINIC | Age: 68
End: 2023-09-19
Attending: PHYSICIAN ASSISTANT
Payer: COMMERCIAL

## 2023-09-19 VITALS
HEART RATE: 74 BPM | BODY MASS INDEX: 25.73 KG/M2 | TEMPERATURE: 100 F | SYSTOLIC BLOOD PRESSURE: 129 MMHG | HEIGHT: 64 IN | WEIGHT: 150.7 LBS | OXYGEN SATURATION: 94 % | RESPIRATION RATE: 16 BRPM | DIASTOLIC BLOOD PRESSURE: 76 MMHG

## 2023-09-19 DIAGNOSIS — Z96.652 STATUS POST UNICOMPARTMENTAL KNEE REPLACEMENT, LEFT: Primary | ICD-10-CM

## 2023-09-19 DIAGNOSIS — M17.12 OSTEOARTHROSIS, LOCALIZED, PRIMARY, KNEE, LEFT: ICD-10-CM

## 2023-09-19 PROCEDURE — 250N000025 HC SEVOFLURANE, PER MIN: Performed by: STUDENT IN AN ORGANIZED HEALTH CARE EDUCATION/TRAINING PROGRAM

## 2023-09-19 PROCEDURE — 999N000065 XR KNEE PORT LEFT 1/2 VIEWS: Mod: LT

## 2023-09-19 PROCEDURE — 258N000003 HC RX IP 258 OP 636: Performed by: NURSE ANESTHETIST, CERTIFIED REGISTERED

## 2023-09-19 PROCEDURE — 272N000001 HC OR GENERAL SUPPLY STERILE: Performed by: STUDENT IN AN ORGANIZED HEALTH CARE EDUCATION/TRAINING PROGRAM

## 2023-09-19 PROCEDURE — 97161 PT EVAL LOW COMPLEX 20 MIN: CPT | Mod: GP | Performed by: PHYSICAL THERAPIST

## 2023-09-19 PROCEDURE — 250N000011 HC RX IP 250 OP 636: Mod: JZ | Performed by: PHYSICIAN ASSISTANT

## 2023-09-19 PROCEDURE — 710N000012 HC RECOVERY PHASE 2, PER MINUTE: Performed by: STUDENT IN AN ORGANIZED HEALTH CARE EDUCATION/TRAINING PROGRAM

## 2023-09-19 PROCEDURE — 258N000001 HC RX 258: Performed by: STUDENT IN AN ORGANIZED HEALTH CARE EDUCATION/TRAINING PROGRAM

## 2023-09-19 PROCEDURE — 250N000013 HC RX MED GY IP 250 OP 250 PS 637: Performed by: STUDENT IN AN ORGANIZED HEALTH CARE EDUCATION/TRAINING PROGRAM

## 2023-09-19 PROCEDURE — 999N000141 HC STATISTIC PRE-PROCEDURE NURSING ASSESSMENT: Performed by: STUDENT IN AN ORGANIZED HEALTH CARE EDUCATION/TRAINING PROGRAM

## 2023-09-19 PROCEDURE — 250N000009 HC RX 250: Performed by: NURSE ANESTHETIST, CERTIFIED REGISTERED

## 2023-09-19 PROCEDURE — 97116 GAIT TRAINING THERAPY: CPT | Mod: GP | Performed by: PHYSICAL THERAPIST

## 2023-09-19 PROCEDURE — 250N000011 HC RX IP 250 OP 636: Mod: JZ | Performed by: ANESTHESIOLOGY

## 2023-09-19 PROCEDURE — 97530 THERAPEUTIC ACTIVITIES: CPT | Mod: GP | Performed by: PHYSICAL THERAPIST

## 2023-09-19 PROCEDURE — C1776 JOINT DEVICE (IMPLANTABLE): HCPCS | Performed by: STUDENT IN AN ORGANIZED HEALTH CARE EDUCATION/TRAINING PROGRAM

## 2023-09-19 PROCEDURE — 258N000003 HC RX IP 258 OP 636: Performed by: ANESTHESIOLOGY

## 2023-09-19 PROCEDURE — 250N000009 HC RX 250: Performed by: STUDENT IN AN ORGANIZED HEALTH CARE EDUCATION/TRAINING PROGRAM

## 2023-09-19 PROCEDURE — 360N000077 HC SURGERY LEVEL 4, PER MIN: Performed by: STUDENT IN AN ORGANIZED HEALTH CARE EDUCATION/TRAINING PROGRAM

## 2023-09-19 PROCEDURE — 250N000011 HC RX IP 250 OP 636: Performed by: STUDENT IN AN ORGANIZED HEALTH CARE EDUCATION/TRAINING PROGRAM

## 2023-09-19 PROCEDURE — 250N000013 HC RX MED GY IP 250 OP 250 PS 637: Performed by: PHYSICIAN ASSISTANT

## 2023-09-19 PROCEDURE — 250N000011 HC RX IP 250 OP 636: Mod: JZ | Performed by: STUDENT IN AN ORGANIZED HEALTH CARE EDUCATION/TRAINING PROGRAM

## 2023-09-19 PROCEDURE — 258N000003 HC RX IP 258 OP 636: Performed by: STUDENT IN AN ORGANIZED HEALTH CARE EDUCATION/TRAINING PROGRAM

## 2023-09-19 PROCEDURE — 370N000017 HC ANESTHESIA TECHNICAL FEE, PER MIN: Performed by: STUDENT IN AN ORGANIZED HEALTH CARE EDUCATION/TRAINING PROGRAM

## 2023-09-19 PROCEDURE — 710N000009 HC RECOVERY PHASE 1, LEVEL 1, PER MIN: Performed by: STUDENT IN AN ORGANIZED HEALTH CARE EDUCATION/TRAINING PROGRAM

## 2023-09-19 PROCEDURE — 250N000011 HC RX IP 250 OP 636: Performed by: NURSE ANESTHETIST, CERTIFIED REGISTERED

## 2023-09-19 PROCEDURE — 27446 REVISION OF KNEE JOINT: CPT | Mod: LT | Performed by: STUDENT IN AN ORGANIZED HEALTH CARE EDUCATION/TRAINING PROGRAM

## 2023-09-19 PROCEDURE — C1713 ANCHOR/SCREW BN/BN,TIS/BN: HCPCS | Performed by: STUDENT IN AN ORGANIZED HEALTH CARE EDUCATION/TRAINING PROGRAM

## 2023-09-19 DEVICE — BONE CEMENT SIMPLEX FULL DOSE 6191-1-001: Type: IMPLANTABLE DEVICE | Site: KNEE | Status: FUNCTIONAL

## 2023-09-19 DEVICE — BONE CEMENT MIXING SYSTEM W/FEM PRESSURIZER 06065730000: Type: IMPLANTABLE DEVICE | Site: KNEE | Status: FUNCTIONAL

## 2023-09-19 DEVICE — IMPLANTABLE DEVICE: Type: IMPLANTABLE DEVICE | Site: KNEE | Status: FUNCTIONAL

## 2023-09-19 RX ORDER — ONDANSETRON 4 MG/1
4 TABLET, ORALLY DISINTEGRATING ORAL EVERY 6 HOURS PRN
Status: DISCONTINUED | OUTPATIENT
Start: 2023-09-19 | End: 2023-09-19 | Stop reason: HOSPADM

## 2023-09-19 RX ORDER — GLYCOPYRROLATE 0.2 MG/ML
INJECTION, SOLUTION INTRAMUSCULAR; INTRAVENOUS PRN
Status: DISCONTINUED | OUTPATIENT
Start: 2023-09-19 | End: 2023-09-19

## 2023-09-19 RX ORDER — HYDROMORPHONE HCL IN WATER/PF 6 MG/30 ML
0.2 PATIENT CONTROLLED ANALGESIA SYRINGE INTRAVENOUS EVERY 5 MIN PRN
Status: DISCONTINUED | OUTPATIENT
Start: 2023-09-19 | End: 2023-09-19 | Stop reason: HOSPADM

## 2023-09-19 RX ORDER — OXYCODONE HYDROCHLORIDE 5 MG/1
5-10 TABLET ORAL EVERY 4 HOURS PRN
Qty: 26 TABLET | Refills: 0 | Status: SHIPPED | OUTPATIENT
Start: 2023-09-19 | End: 2024-06-14

## 2023-09-19 RX ORDER — CEFAZOLIN SODIUM/WATER 2 G/20 ML
2 SYRINGE (ML) INTRAVENOUS SEE ADMIN INSTRUCTIONS
Status: DISCONTINUED | OUTPATIENT
Start: 2023-09-19 | End: 2023-09-19 | Stop reason: HOSPADM

## 2023-09-19 RX ORDER — ONDANSETRON 2 MG/ML
4 INJECTION INTRAMUSCULAR; INTRAVENOUS EVERY 30 MIN PRN
Status: DISCONTINUED | OUTPATIENT
Start: 2023-09-19 | End: 2023-09-19 | Stop reason: HOSPADM

## 2023-09-19 RX ORDER — PROCHLORPERAZINE MALEATE 5 MG
5 TABLET ORAL EVERY 6 HOURS PRN
Status: DISCONTINUED | OUTPATIENT
Start: 2023-09-19 | End: 2023-09-19 | Stop reason: HOSPADM

## 2023-09-19 RX ORDER — BISACODYL 10 MG
10 SUPPOSITORY, RECTAL RECTAL DAILY PRN
Status: DISCONTINUED | OUTPATIENT
Start: 2023-09-19 | End: 2023-09-19 | Stop reason: HOSPADM

## 2023-09-19 RX ORDER — LIDOCAINE HYDROCHLORIDE 20 MG/ML
INJECTION, SOLUTION INFILTRATION; PERINEURAL PRN
Status: DISCONTINUED | OUTPATIENT
Start: 2023-09-19 | End: 2023-09-19

## 2023-09-19 RX ORDER — DEXAMETHASONE SODIUM PHOSPHATE 4 MG/ML
INJECTION, SOLUTION INTRA-ARTICULAR; INTRALESIONAL; INTRAMUSCULAR; INTRAVENOUS; SOFT TISSUE PRN
Status: DISCONTINUED | OUTPATIENT
Start: 2023-09-19 | End: 2023-09-19

## 2023-09-19 RX ORDER — FENTANYL CITRATE 50 UG/ML
25 INJECTION, SOLUTION INTRAMUSCULAR; INTRAVENOUS EVERY 5 MIN PRN
Status: DISCONTINUED | OUTPATIENT
Start: 2023-09-19 | End: 2023-09-19 | Stop reason: HOSPADM

## 2023-09-19 RX ORDER — CEFAZOLIN SODIUM 1 G/3ML
1 INJECTION, POWDER, FOR SOLUTION INTRAMUSCULAR; INTRAVENOUS EVERY 8 HOURS
Status: DISCONTINUED | OUTPATIENT
Start: 2023-09-19 | End: 2023-09-19 | Stop reason: HOSPADM

## 2023-09-19 RX ORDER — TRANEXAMIC ACID 650 MG/1
1950 TABLET ORAL ONCE
Status: COMPLETED | OUTPATIENT
Start: 2023-09-19 | End: 2023-09-19

## 2023-09-19 RX ORDER — FENTANYL CITRATE 50 UG/ML
INJECTION, SOLUTION INTRAMUSCULAR; INTRAVENOUS PRN
Status: DISCONTINUED | OUTPATIENT
Start: 2023-09-19 | End: 2023-09-19

## 2023-09-19 RX ORDER — SODIUM CHLORIDE, SODIUM LACTATE, POTASSIUM CHLORIDE, CALCIUM CHLORIDE 600; 310; 30; 20 MG/100ML; MG/100ML; MG/100ML; MG/100ML
INJECTION, SOLUTION INTRAVENOUS CONTINUOUS
Status: DISCONTINUED | OUTPATIENT
Start: 2023-09-19 | End: 2023-09-19 | Stop reason: HOSPADM

## 2023-09-19 RX ORDER — LIDOCAINE 40 MG/G
CREAM TOPICAL
Status: DISCONTINUED | OUTPATIENT
Start: 2023-09-19 | End: 2023-09-19 | Stop reason: HOSPADM

## 2023-09-19 RX ORDER — ONDANSETRON 2 MG/ML
4 INJECTION INTRAMUSCULAR; INTRAVENOUS EVERY 6 HOURS PRN
Status: DISCONTINUED | OUTPATIENT
Start: 2023-09-19 | End: 2023-09-19 | Stop reason: HOSPADM

## 2023-09-19 RX ORDER — KETOROLAC TROMETHAMINE 15 MG/ML
15 INJECTION, SOLUTION INTRAMUSCULAR; INTRAVENOUS EVERY 6 HOURS
Status: DISCONTINUED | OUTPATIENT
Start: 2023-09-19 | End: 2023-09-19 | Stop reason: HOSPADM

## 2023-09-19 RX ORDER — HYDROMORPHONE HCL IN WATER/PF 6 MG/30 ML
0.4 PATIENT CONTROLLED ANALGESIA SYRINGE INTRAVENOUS EVERY 5 MIN PRN
Status: DISCONTINUED | OUTPATIENT
Start: 2023-09-19 | End: 2023-09-19 | Stop reason: HOSPADM

## 2023-09-19 RX ORDER — ONDANSETRON 4 MG/1
4 TABLET, ORALLY DISINTEGRATING ORAL EVERY 30 MIN PRN
Status: DISCONTINUED | OUTPATIENT
Start: 2023-09-19 | End: 2023-09-19 | Stop reason: HOSPADM

## 2023-09-19 RX ORDER — FENTANYL CITRATE 50 UG/ML
50 INJECTION, SOLUTION INTRAMUSCULAR; INTRAVENOUS EVERY 5 MIN PRN
Status: DISCONTINUED | OUTPATIENT
Start: 2023-09-19 | End: 2023-09-19 | Stop reason: HOSPADM

## 2023-09-19 RX ORDER — POLYETHYLENE GLYCOL 3350 17 G/17G
1 POWDER, FOR SOLUTION ORAL DAILY
Qty: 7 PACKET | Refills: 0 | Status: SHIPPED | OUTPATIENT
Start: 2023-09-19 | End: 2024-06-14

## 2023-09-19 RX ORDER — ACETAMINOPHEN 325 MG/1
650 TABLET ORAL EVERY 4 HOURS PRN
Status: DISCONTINUED | OUTPATIENT
Start: 2023-09-22 | End: 2023-09-19 | Stop reason: HOSPADM

## 2023-09-19 RX ORDER — ASPIRIN 81 MG/1
81 TABLET ORAL 2 TIMES DAILY
Qty: 60 TABLET | Refills: 0 | Status: SHIPPED | OUTPATIENT
Start: 2023-09-19 | End: 2024-06-14

## 2023-09-19 RX ORDER — AMOXICILLIN 250 MG
1-2 CAPSULE ORAL 2 TIMES DAILY
Qty: 30 TABLET | Refills: 0 | Status: SHIPPED | OUTPATIENT
Start: 2023-09-19 | End: 2024-06-14

## 2023-09-19 RX ORDER — ACETAMINOPHEN 325 MG/1
975 TABLET ORAL EVERY 8 HOURS
Status: DISCONTINUED | OUTPATIENT
Start: 2023-09-19 | End: 2023-09-19 | Stop reason: HOSPADM

## 2023-09-19 RX ORDER — OXYCODONE HYDROCHLORIDE 5 MG/1
10 TABLET ORAL EVERY 4 HOURS PRN
Status: DISCONTINUED | OUTPATIENT
Start: 2023-09-19 | End: 2023-09-19 | Stop reason: HOSPADM

## 2023-09-19 RX ORDER — ACETAMINOPHEN 325 MG/1
650 TABLET ORAL EVERY 4 HOURS PRN
Qty: 100 TABLET | Refills: 0 | Status: SHIPPED | OUTPATIENT
Start: 2023-09-19 | End: 2024-06-14

## 2023-09-19 RX ORDER — AMOXICILLIN 500 MG/1
CAPSULE ORAL
COMMUNITY
Start: 2023-09-08 | End: 2024-06-14

## 2023-09-19 RX ORDER — ASPIRIN 81 MG/1
81 TABLET ORAL 2 TIMES DAILY
Status: DISCONTINUED | OUTPATIENT
Start: 2023-09-19 | End: 2023-09-19 | Stop reason: HOSPADM

## 2023-09-19 RX ORDER — CEFAZOLIN SODIUM/WATER 2 G/20 ML
2 SYRINGE (ML) INTRAVENOUS
Status: COMPLETED | OUTPATIENT
Start: 2023-09-19 | End: 2023-09-19

## 2023-09-19 RX ORDER — ONDANSETRON 2 MG/ML
INJECTION INTRAMUSCULAR; INTRAVENOUS PRN
Status: DISCONTINUED | OUTPATIENT
Start: 2023-09-19 | End: 2023-09-19

## 2023-09-19 RX ORDER — IBUPROFEN 600 MG/1
600 TABLET, FILM COATED ORAL EVERY 6 HOURS PRN
Qty: 30 TABLET | Refills: 0 | Status: SHIPPED | OUTPATIENT
Start: 2023-09-19 | End: 2024-06-14

## 2023-09-19 RX ORDER — HYDROMORPHONE HCL IN WATER/PF 6 MG/30 ML
0.2 PATIENT CONTROLLED ANALGESIA SYRINGE INTRAVENOUS
Status: DISCONTINUED | OUTPATIENT
Start: 2023-09-19 | End: 2023-09-19 | Stop reason: HOSPADM

## 2023-09-19 RX ORDER — AMOXICILLIN 250 MG
1 CAPSULE ORAL 2 TIMES DAILY
Status: DISCONTINUED | OUTPATIENT
Start: 2023-09-19 | End: 2023-09-19 | Stop reason: HOSPADM

## 2023-09-19 RX ORDER — HYDROMORPHONE HCL IN WATER/PF 6 MG/30 ML
0.4 PATIENT CONTROLLED ANALGESIA SYRINGE INTRAVENOUS
Status: DISCONTINUED | OUTPATIENT
Start: 2023-09-19 | End: 2023-09-19 | Stop reason: HOSPADM

## 2023-09-19 RX ORDER — PROPOFOL 10 MG/ML
INJECTION, EMULSION INTRAVENOUS PRN
Status: DISCONTINUED | OUTPATIENT
Start: 2023-09-19 | End: 2023-09-19

## 2023-09-19 RX ORDER — POLYETHYLENE GLYCOL 3350 17 G/17G
17 POWDER, FOR SOLUTION ORAL DAILY
Status: DISCONTINUED | OUTPATIENT
Start: 2023-09-20 | End: 2023-09-19 | Stop reason: HOSPADM

## 2023-09-19 RX ORDER — OXYCODONE HYDROCHLORIDE 5 MG/1
5 TABLET ORAL EVERY 4 HOURS PRN
Status: DISCONTINUED | OUTPATIENT
Start: 2023-09-19 | End: 2023-09-19 | Stop reason: HOSPADM

## 2023-09-19 RX ADMIN — HYDROMORPHONE HYDROCHLORIDE 0.5 MG: 1 INJECTION, SOLUTION INTRAMUSCULAR; INTRAVENOUS; SUBCUTANEOUS at 10:37

## 2023-09-19 RX ADMIN — PHENYLEPHRINE HYDROCHLORIDE 200 MCG: 10 INJECTION INTRAVENOUS at 10:56

## 2023-09-19 RX ADMIN — HYDROMORPHONE HYDROCHLORIDE 0.5 MG: 1 INJECTION, SOLUTION INTRAMUSCULAR; INTRAVENOUS; SUBCUTANEOUS at 09:51

## 2023-09-19 RX ADMIN — DEXAMETHASONE SODIUM PHOSPHATE 4 MG: 4 INJECTION, SOLUTION INTRA-ARTICULAR; INTRALESIONAL; INTRAMUSCULAR; INTRAVENOUS; SOFT TISSUE at 09:29

## 2023-09-19 RX ADMIN — CEFAZOLIN 1 G: 1 INJECTION, POWDER, FOR SOLUTION INTRAMUSCULAR; INTRAVENOUS at 15:53

## 2023-09-19 RX ADMIN — LIDOCAINE HYDROCHLORIDE 30 MG: 20 INJECTION, SOLUTION INFILTRATION; PERINEURAL at 09:29

## 2023-09-19 RX ADMIN — ONDANSETRON 4 MG: 2 INJECTION INTRAMUSCULAR; INTRAVENOUS at 11:43

## 2023-09-19 RX ADMIN — PROPOFOL 180 MG: 10 INJECTION, EMULSION INTRAVENOUS at 09:29

## 2023-09-19 RX ADMIN — TRANEXAMIC ACID 1950 MG: 650 TABLET ORAL at 07:50

## 2023-09-19 RX ADMIN — FENTANYL CITRATE 100 MCG: 50 INJECTION, SOLUTION INTRAMUSCULAR; INTRAVENOUS at 09:29

## 2023-09-19 RX ADMIN — PROPOFOL 70 MG: 10 INJECTION, EMULSION INTRAVENOUS at 09:51

## 2023-09-19 RX ADMIN — PHENYLEPHRINE HYDROCHLORIDE 100 MCG: 10 INJECTION INTRAVENOUS at 09:33

## 2023-09-19 RX ADMIN — ONDANSETRON 4 MG: 2 INJECTION INTRAMUSCULAR; INTRAVENOUS at 10:51

## 2023-09-19 RX ADMIN — GLYCOPYRROLATE 0.1 MG: 0.2 INJECTION, SOLUTION INTRAMUSCULAR; INTRAVENOUS at 09:29

## 2023-09-19 RX ADMIN — ACETAMINOPHEN 975 MG: 325 TABLET, FILM COATED ORAL at 12:36

## 2023-09-19 RX ADMIN — PHENYLEPHRINE HYDROCHLORIDE 200 MCG: 10 INJECTION INTRAVENOUS at 09:43

## 2023-09-19 RX ADMIN — Medication 2 G: at 09:21

## 2023-09-19 RX ADMIN — SODIUM CHLORIDE, POTASSIUM CHLORIDE, SODIUM LACTATE AND CALCIUM CHLORIDE: 600; 310; 30; 20 INJECTION, SOLUTION INTRAVENOUS at 09:20

## 2023-09-19 RX ADMIN — KETOROLAC TROMETHAMINE 15 MG: 15 INJECTION, SOLUTION INTRAMUSCULAR; INTRAVENOUS at 11:44

## 2023-09-19 RX ADMIN — SODIUM CHLORIDE, POTASSIUM CHLORIDE, SODIUM LACTATE AND CALCIUM CHLORIDE: 600; 310; 30; 20 INJECTION, SOLUTION INTRAVENOUS at 12:34

## 2023-09-19 ASSESSMENT — ACTIVITIES OF DAILY LIVING (ADL)
ADLS_ACUITY_SCORE: 21

## 2023-09-19 NOTE — PLAN OF CARE
Patient vital signs are at baseline: Yes  Patient able to ambulate as they were prior to admission or with assist devices provided by therapies during their stay:  Yes  Patient MUST void prior to discharge:  Yes  Patient able to tolerate oral intake:  Yes  Pain has adequate pain control using Oral analgesics:  Yes  Does patient have an identified :  Yes  Has goal D/C date and time been discussed with patient:  Yes   Pt and spouse/ verbalize understanding of all instructions and medications reviewed. Deny and concerns or further questions. Home with all belongings,walker and meds. Accounted for.                  THORACIC SURGERY FOLLOW UP VISIT    Dear Dr. Webber,    I saw Ms. Monteiro in follow-up today. 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  Histologic grade is a well-differentiated maximal size 1.7 cm with no visceral pleural invasion, no lymphovascular invasion.   Lymph nodes station 10 and 11 were all negative.  The staging was T1a N0 M0 for staging 1A.        COMPLICATIONS  None    INTERVAL STUDIES    Chest CT today-appears that the waxing/waning nodules in the right lung are somewhat improved compared to the CT from March. I believe there are 2 additional nodules in the left as seen in series 4 and images 138 and 148. Final read pending at time of clinic appointment.    ETOH social  TOB Former Smoker  BMI 27.3    SUBJECTIVE   Karina denies cough, SOB, chest pain, fatigue, fevers or unexplained weight loss.    From a personal perspective, Karina is here with her daughter. They recently took a 4 day road trip through the [place] [facility] and part of [place].    IMPRESSION (C34.12) Primary malignant neoplasm of left upper lobe of lung (H)  (primary encounter diagnosis)  Comment: await final report of chest CT  Plan: if the left sided nodules are indeed new, will present at Nodule Conference to discuss follow up plan    76 year-old female with lung cancer status post left lower lobectomy.    PLAN  I spent a total of 25 minutes with Ms. Karina Monteiro and her daughter, more than 50% of which were spent in counseling, coordination of care, and face-to-face time. I reviewed the plan as follows:  Await final  reading of today's chest CT. If there are new nodules, will present at Nodule Conference to decide follow up.  1. Necessary Tests & Appointments: Chest CT and clinic-date to be determined pending above.  2. Pain Control Plan: N/A  3. Anticoagulation Plan: N/A  4. Smoking Cessation: N/A    All questions were answered and the patient and present family were in agreement with the plan.  I appreciate the opportunity to participate in the care of your patient and will keep you updated.  Sincerely,

## 2023-09-19 NOTE — BRIEF OP NOTE
Orthopaedic Surgery Brief Op-Note      Patient: Layla Lazaro  : 1955  Date of Service: 2023 11:32 AM    Pre-operative Diagnosis: Osteoarthrosis, localized, primary, knee, left [M17.12]  Post-operative Diagnosis: same    Procedure(s) Performed: Procedure(s):  Left unicompartment knee arthroplasty    Staff: Dr. Torrez   Assistants:   Tyshawn Page PA-C    Anesthesia: General  EBL: 50 cc  UOP: see anesthesia record  Tourniquet Time: 15 minutes at 250 mmHg    Implants:   Implant Name Type Inv. Item Serial No.  Lot No. LRB No. Used Action   BONE CEMENT SIMPLEX FULL DOSE 6191-1-001 - [id number] Cement, Bone BONE CEMENT SIMPLEX FULL DOSE 6191-1-001  [employer] [id number] Left 1 Implanted   KNEE UNI TIBIA TRAY MOBILE BEAR SZ B LM - [id number] Total Joint Component/Insert KNEE UNI TIBIA TRAY MOBILE BEAR SZ B LM  [employer] [id number] Left 1 Implanted   KNEE OXFORD UNI FEMORAL SM - [id number] Total Joint Component/Insert KNEE OXFORD UNI FEMORAL SM  [employer] [id number] Left 1 Implanted   INSERT OXFORD ANATOMIC BEARING L SM SZ 3 - [id number] Total Joint Component/Insert INSERT OXFORD ANATOMIC BEARING L SM SZ 3  [employer] [id number] Left 1 Implanted     Drains: none  Intra-op Labs/Cxs/Specimens: * No specimens in log *  Complications: No apparent complications during procedure  Findings: Please see dictated operative note for details    Disposition: Stable to PACU, then admit to Orthopaedics.    Post-Op Plan:  Assessment/Plan: Layla Lazaro is a 68 year old female s/p Procedure(s):  Left unicompartment knee arthroplasty on 2023 with Dr. Torrez .    Activity: Up with assist and assistive devices as needed until independent. Knee ROM as tolerated.   Weight bearing status: WBAT    Antibiotics: Cefazolin x 24 hours   Diet: Begin with clear fluids and progress diet as tolerated. Bowel regimen. Anti-emetics PRN.    DVT prophylaxis: Aspirin 162mg daily x 4  weeks, starting morning of POD 1  Elevation: Elevate heels off of bed on pillows, no pillows behind the knee at any time    Wound Care: Alginate, tegaderm to be changed PRN by ortho PRN  Drains: none  Steen: none  Pain management: Orals PRN, IV for breakthrough only  X-rays: AP/Lat knee XR in PACU  Physical Therapy: Mobilization, ROM, ADL's  Occupational Therapy: ADL's  Labs: Trend Hgb on PODs #1 & 2  Cultures: None  Consults: PT, OT. Hospitalist, appreciate assistance in caring for this patient throughout the perioperative period    Future Appointments   Date Time Provider Department Center   9/19/2023  4:15 PM Gaby Beavers, PT [facility]   9/20/2023  8:15 AM Janessa Hercules, OTR [facility] [place] [facility]   9/20/2023  9:00 AM Lakeshia Esparza, DARIEN [facility]   10/2/2023 10:00 AM Tyshawn Page PA-C [facility]   11/1/2023  1:20 PM Jose Juan Torrez MD [facility]       Disposition: Pending progress with therapies, pain control on orals, and medical stability, anticipate discharge to Home vs TCU on POD #1-2.  Follow up: Plan for follow up with Dr. Torrez in 2 weeks     I assisted with positioning, prepping and draping, and closure.      Tyshawn Page PA-C  9/19/2023 11:32 AM  Physician Assistant   Oncology and Adult Reconstructive Surgery  Dept Orthopaedic Surgery, [facility]     Thank you for allowing me to participate in this patient's care. Please page me directly any questions/concerns.   Securely message with the Vocera Web Console (learn more here)  Text page via [employer] Paging/Directory    If there is no response, if it is a weekend, or if it is during evening hours, please page the orthopaedic surgery resident on call via [employer] Paging/Directory

## 2023-09-19 NOTE — OP NOTE
[facility]    Operative Note    Pre-operative diagnosis: 68-year-old female with history of chronic left knee pain due to end-stage osteoarthritic changes most notably in medial compartment who was insufficiently responding to nonsurgical treatment options     Post-operative diagnosis Same as pre-operative diagnosis    Procedure: Procedure(s):  Left unicompartment knee arthroplasty  Surgeon: Surgeon(s) and Role:     * Jose Juan Torrez MD - Primary     * Nan Mayo - Resident     * Tyshawn Page - PAc  Anesthesia: General   Estimated Blood Loss: 200 mL from 9/19/2023  9:19 AM to 9/19/2023 11:09 AM      Drains: None  Specimens: * No specimens in log *    Complications: None.  Implants:   Implant Name Type Inv. Item Serial No.  Lot No. LRB No. Used Action   BONE CEMENT SIMPLEX FULL DOSE 6191-1-001 - [id number] Cement, Bone BONE CEMENT SIMPLEX FULL DOSE 6191-1-001  [employer] [id number] Left 1 Implanted   BONE CEMENT MIXING SYSTEM W/FEM PRESSURIZER [id number] - [id number] Cement, Bone BONE CEMENT MIXING SYSTEM W/FEM PRESSURIZER [id number]  [employer]  Left 1 Used as a Supply   KNEE UNI TIBIA TRAY MOBILE BEAR SZ B LM - [id number] Total Joint Component/Insert KNEE UNI TIBIA TRAY MOBILE BEAR SZ B LM  [employer] [id number] Left 1 Implanted   KNEE OXFORD UNI FEMORAL SM - [id number] Total Joint Component/Insert KNEE OXFORD UNI FEMORAL SM  [employer] [id number] Left 1 Implanted   INSERT OXFORD ANATOMIC BEARING L SM SZ 3 - [id number] Total Joint Component/Insert INSERT OXFORD ANATOMIC BEARING L SM SZ 3  [employer] [id number] Left 1 Implanted     Components used: [employer] Oxford Unicondylar Knee left medial Arthroplasty femur size small, tibial component size B and insert 3 mm.     Description of procedure:  Patient was seen in the preoperative holding where procedure, risks and complications, expectations and rehabilitation were discussed once more.   Patient understands and agrees to the treatment plan as set forth.  The left lower extremity was marked and informed consent was obtained.  Patient was then taken to the operating room where general anesthesia was induced.  Patient was positioned supine with the operative leg in the Oxford unicondylar knee leg holders.  The let lower extremity was then prepped and draped in usual sterile fashion.  After the completion of a timeout procedure a skin incision was made from the superomedial patellar border down to the tibial tuberosity.  The subcutaneous tissue was opened using cautery.  Now the medial extensor mechanism and retinaculum was identified.  A medial parapatellar arthrotomy was performed.  Hoffa's fat pad was incised.  Now the anterior horn of the medial meniscus was removed and the medial capsular flap was developed.  A Hohmann was placed to protect the medial collateral ligament.  Now the first the ACL was inspected which was intact.  The patellofemoral joint showed focal grade 3 osteoarthritic changes of the trochlea. The patellar articular surface also showed focal grade 2-3.  The lateral compartment showed  intact articular surfaces with an intact meniscus as far as could be visualized.  It was decided to continue with the procedure as planned since she clinically has pain medial, responded well to UKA on contralateral side and the data suggesting that PF OA does not have to be a hard contraindicatin for UKA.. First the osteophytes in the medial notch, the medial femoral condyle and medial tibial plateau were removed using osteotomes and rongeurs.  Then the external tibial alignment guide was used and placed in such a manner that the slope and varus valgus alignment were appropriate.  Using the GE clamp with the large jig at +0 mm the cutting block was placed in the right position.  This was then pinned using 2 pins.  A vertical osteotomy just medial of the tibial eminence was made using a reciprocating  saw.  Now using an oscillating saw a horizontal tibial cut was made.  The bone block was resected and it was verified no bone fragments remained.    Now the femoral intramedullary canal was opened with the placed 1 cm anterior of the PCL insertion and 2 to 3 mm medial.  The center of the medial condyle was now marked.  The femoral drillguide was placed and pinned into place taking into acount the appropriate orientation.  The posterior femoral cut was made.The flexion gap was measured and deemed adequate. The alignment guide was removed and a 0 spigot was placed.  The first milling was performed.  Excess osteophytes were removed.  At this point the remainder of the posterior medial meniscus was removed as well.  Now the trial components were placed in the flexion extension gap was measured.  A 3 mm was preferred in flexion and a 1 mm insert was preferred in extension.  Using the #2 spigot the second milling was done.  Again access osteophytes were removed.  Now the trial components were placed again and with a 3 spacer block the knee was well balanced in both flexion and extension.  At this point the femoral component preparation was finalized by means of the unicorn reaming guide with the posterior osteophyte cutting block.  Once this was completed the tibial component was further prepared.  Using the T-handle it was ascertained that the tibial component was not placed to posteriorly.  The tibial component was then pinned in place and the slot was prepared using the keel prep.  Once more the trial components were placed with a 3 mm liner.  Again the knee was well balanced and had a full range of motion.  Tourniquet was inflated to 250mmHg. All trial components were then removed and the knee was copiously irrigated.  Posterior capsular block was placed.The final components which include a tibial plateau size B and femoral component size small were cemented in place.  Excess cement was removed well cement was cured  with the knee and 30 degrees of flexion and a 3 mm spacer block in place.  Once the cement was cured a posterior and anterior capsular block was placed using the anaesthetic cocktail. The definitive 3 mm liner was placed.  Again the knee was copiously irrigated. Tourniquet was deflated.   The medial parapatellar arthrotomy was closed using interrupted figure-of-8  #1 Vicryl sutures.  Subcutaneous tissue was closed using 2-0 Vicryl sutures.  The skin was closed using a 3-0 PDS suture.  The tourniquet was deflated.     The incision was gently and compressively dressed with Steri-Strips and a combination of Alginate and Tegaderm.  Patient was awakened and returned to recovery in good condition.     Postoperative plan:  X-rays in PACU  Weightbearing as tolerated  Antibiotics 24 hours  Plan on discharge today or tomorrow  PT/OT  Aspirin 162 mg daily for 4 weeks as DVT prophylaxis  Clinic visit Dr. Torrez 2 and 6 weeks      Jose Juan Torrez MD     Adult Reconstruction  [facility] Department of Orthopaedic Surgery  Pager [phone]

## 2023-09-19 NOTE — ANESTHESIA POSTPROCEDURE EVALUATION
Patient: Layla Lazaro    Procedure: Procedure(s):  Left unicompartment knee arthroplasty       Anesthesia Type:  General    Note:  Disposition: Admission   Postop Pain Control: Uneventful            Sign Out: Well controlled pain   PONV: No   Neuro/Psych: Uneventful            Sign Out: Acceptable/Baseline neuro status   Airway/Respiratory: Uneventful            Sign Out: Acceptable/Baseline resp. status   CV/Hemodynamics: Uneventful            Sign Out: Acceptable CV status; No obvious hypovolemia; No obvious fluid overload   Other NRE: NONE   DID A NON-ROUTINE EVENT OCCUR? No           Last vitals:  Vitals Value Taken Time   /79 09/19/23 1200   Temp 97.8  F (36.6  C) 09/19/23 1113   Pulse 72 09/19/23 1224   Resp 30 09/19/23 1224   SpO2 95 % 09/19/23 1224   Vitals shown include unvalidated device data.    Electronically Signed By: Mannie Kamara MD  September 19, 2023  12:26 PM

## 2023-09-19 NOTE — ANESTHESIA PREPROCEDURE EVALUATION
Anesthesia Pre-Procedure Evaluation    Patient: Layla Lazaro   MRN: [id number] : 1955        Procedure : Procedure(s):  Left unicompartment knee arthroplasty  Possible left total knee arthroplasty          Past Medical History:   Diagnosis Date    Colon polyps     Heart murmur     Hemochromatosis     gives blood every 3 mo      Past Surgical History:   Procedure Laterality Date    ARTHROPLASTY KNEE UNICOMPARTMENT Right 3/14/2023    Procedure: Right knee unicompartmental arthroplasty;  Surgeon: Jose Juan Torrez MD;  Location: [facility] OR    COLONOSCOPY      EYE SURGERY      as child    MOUTH SURGERY      wisdom teeth age 17      No Known Allergies   Social History     Tobacco Use    Smoking status: Former     Types: Cigarettes     Quit date:      Years since quittin.7    Smokeless tobacco: Never   Substance Use Topics    Alcohol use: Not Currently      Wt Readings from Last 1 Encounters:   23 68.4 kg (150 lb 11.2 oz)        Anesthesia Evaluation   Pt has had prior anesthetic. Type: General.    No history of anesthetic complications       ROS/MED HX  ENT/Pulmonary:  - neg pulmonary ROS     Neurologic:  - neg neurologic ROS     Cardiovascular:     (+)  - -   -  - -                           valvular problems/murmurs           METS/Exercise Tolerance:     Hematologic:  - neg hematologic  ROS     Musculoskeletal:  - neg musculoskeletal ROS     GI/Hepatic:  - neg GI/hepatic ROS     Renal/Genitourinary:  - neg Renal ROS     Endo:  - neg endo ROS     Psychiatric/Substance Use:  - neg psychiatric ROS     Infectious Disease:  - neg infectious disease ROS     Malignancy:       Other:            Physical Exam    Airway        Mallampati: II   TM distance: > 3 FB   Neck ROM: full   Mouth opening: > 3 cm    Respiratory Devices and Support         Dental     Comment: .Root canal after infection of jaw recently; cleared for surgery    (+) Minor Abnormalities - some fillings, tiny  chips      Cardiovascular   cardiovascular exam normal          Pulmonary   pulmonary exam normal                OUTSIDE LABS:  CBC:   Lab Results   Component Value Date    WBC 7.1 08/21/2023    WBC 7.6 02/27/2023    HGB 14.8 08/21/2023    HGB 15.5 02/27/2023    HCT 44.1 08/21/2023    HCT 45.6 02/27/2023     08/21/2023     02/27/2023     BMP:   Lab Results   Component Value Date     08/21/2023     02/27/2023    POTASSIUM 4.2 08/21/2023    POTASSIUM 3.8 02/27/2023    CHLORIDE 104 08/21/2023    CHLORIDE 104 02/27/2023    CO2 27 08/21/2023    CO2 25 02/27/2023    BUN 14.3 08/21/2023    BUN 16.7 02/27/2023    CR 0.62 08/21/2023    CR 0.57 02/27/2023    GLC 89 08/21/2023    GLC 89 02/27/2023     COAGS: No results found for: PTT, INR, FIBR  POC: No results found for: BGM, HCG, HCGS  HEPATIC: No results found for: ALBUMIN, PROTTOTAL, ALT, AST, GGT, ALKPHOS, BILITOTAL, BILIDIRECT, NGUYEN  OTHER:   Lab Results   Component Value Date    DANYEL 9.7 08/21/2023       Anesthesia Plan    ASA Status:  2       Anesthesia Type: General.     - Airway: LMA   Induction: Intravenous.   Maintenance: Balanced.        Consents    Anesthesia Plan(s) and associated risks, benefits, and realistic alternatives discussed. Questions answered and patient/representative(s) expressed understanding.     - Discussed:     - Discussed with:  Patient      - Extended Intubation/Ventilatory Support Discussed: No.      - Patient is DNR/DNI Status: No     Use of blood products discussed: No .     Postoperative Care    Pain management: IV analgesics, Oral pain medications, Multi-modal analgesia.   PONV prophylaxis: Ondansetron (or other 5HT-3), Dexamethasone or Solumedrol     Comments:                Mannie Kamara MD

## 2023-09-19 NOTE — ANESTHESIA PROCEDURE NOTES
Airway       Patient location during procedure: OR       Procedure Start/Stop Times: 9/19/2023 9:30 AM  Staff -        CRNA: Mannie Sanchez APRN CRNA       Performed By: CRNAIndications and Patient Condition       Indications for airway management: judit-procedural and airway protection       Induction type:intravenous       Mask difficulty assessment: 0 - not attempted    Final Airway Details       Final airway type: supraglottic airway    Supraglottic Airway Details        Type: LMA       Brand: I-Gel       LMA size: 4    Post intubation assessment        Placement verified by: capnometry, equal breath sounds and chest rise        Number of attempts at approach: 1       Secured with: commercial tube ley       Ease of procedure: easy       Dentition: Intact    Medication(s) Administered   Medication Administration Time: 9/19/2023 9:30 AM

## 2023-09-19 NOTE — ANESTHESIA CARE TRANSFER NOTE
Patient: Layla Lazaro    Procedure: Procedure(s):  Left unicompartment knee arthroplasty       Diagnosis: Osteoarthrosis, localized, primary, knee, left [M17.12]  Diagnosis Additional Information: No value filed.    Anesthesia Type:   General     Note:    Oropharynx: oral airway in place  Level of Consciousness: drowsy  Oxygen Supplementation: face mask  Level of Supplemental Oxygen (L/min / FiO2): 10  Independent Airway: airway patency satisfactory and stable  Dentition: dentition unchanged  Vital Signs Stable: post-procedure vital signs reviewed and stable  Report to RN Given: handoff report given  Patient transferred to: PACU    Handoff Report: Identifed the Patient, Identified the Reponsible Provider, Reviewed the pertinent medical history, Discussed the surgical course, Reviewed Intra-OP anesthesia mangement and issues during anesthesia, Set expectations for post-procedure period and Allowed opportunity for questions and acknowledgement of understanding      Vitals:  Vitals Value Taken Time   BP     Temp     Pulse     Resp 39 09/19/23 1110   SpO2 98 % 09/19/23 1110   Vitals shown include unvalidated device data.    Electronically Signed By: MATTHEW Jackson CRNA  September 19, 2023  11:12 AM

## 2023-09-19 NOTE — PROGRESS NOTES
09/19/23 1615   Appointment Info   Signing Clinician's Name / Credentials (PT) Gaby Beavers, DARIEN   Quick Adds   Quick Adds Certification   Living Environment   People in Home spouse   Current Living Arrangements house   Home Accessibility stairs to enter home   Number of Stairs, Main Entrance 6   Stair Railings, Main Entrance railings on both sides of stairs   Transportation Anticipated family or friend will provide   Living Environment Comments main level living   Self-Care   Usual Activity Tolerance good   Current Activity Tolerance moderate   Equipment Currently Used at Home none  (has a walker without wheels)   Fall history within last six months no   Activity/Exercise/Self-Care Comment normally independent with mobility and cares   General Information   Onset of Illness/Injury or Date of Surgery 09/19/23   Referring Physician Tyshawn Page, PAJean MarieC   Patient/Family Therapy Goals Statement (PT) return home today   Pertinent History of Current Problem (include personal factors and/or comorbidities that impact the POC) Patient seen POD #0 s/p L unicompartmental knee arthroplasty; see medical record for further information   Existing Precautions/Restrictions fall   Weight-Bearing Status - LLE weight-bearing as tolerated   Cognition   Cognitive Status Comments appears intact during session   Pain Assessment   Patient Currently in Pain Yes, see Vital Sign flowsheet  (mild L knee pain)   Integumentary/Edema   Integumentary/Edema Comments L knee incision; covered   Range of Motion (ROM)   ROM Comment L knee limited by recent surgery and pain; others appear WFL   Strength (Manual Muscle Testing)   Strength Comments L knee limited by recent surgery and mild pain; others appear WFL   Bed Mobility   Comment, (Bed Mobility) independent   Transfers   Comment, (Transfers) sit>stand with CGA and use of walker   Gait/Stairs (Locomotion)   Distance in Feet 25 feet   Comment, (Gait/Stairs) CGA and walker   Balance   Balance  Comments intact with use of walker   Clinical Impression   Criteria for Skilled Therapeutic Intervention Yes, treatment indicated   PT Diagnosis (PT) impaired functional mobility   Influenced by the following impairments decreased L knee ROM/strength; mild pain   Functional limitations due to impairments impaired independence with mobility and cares secondary to above deficits   Clinical Presentation (PT Evaluation Complexity) Stable/Uncomplicated   Clinical Presentation Rationale clinical judgement; level of assist   Clinical Decision Making (Complexity) low complexity   Planned Therapy Interventions (PT) bed mobility training;gait training;strengthening;stair training;transfer training   Anticipated Equipment Needs at Discharge (PT) walker, rolling  (has standard walker)   Risk & Benefits of therapy have been explained evaluation/treatment results reviewed;care plan/treatment goals reviewed;current/potential barriers reviewed;risks/benefits reviewed;participants voiced agreement with care plan;participants included;patient;spouse/significant other   PT Total Evaluation Time   PT Eval, Low Complexity Minutes (71260) 10   Plan of Care Review   Plan of Care Reviewed With patient;spouse   Therapy Certification   Start of care date 09/19/23   Certification date from 09/19/23   Certification date to 09/19/23   Medical Diagnosis L unicompartmental knee arthroplasty   Physical Therapy Goals   PT Frequency One time eval and treatment only   PT Predicted Duration/Target Date for Goal Attainment 09/19/23   PT Goals Bed Mobility;Transfers;Gait;Stairs   PT: Bed Mobility Independent;Supine to/from sit;Rolling   PT: Transfers Supervision/stand-by assist;Sit to/from stand;Bed to/from chair;Assistive device   PT: Gait Supervision/stand-by assist;Rolling walker  (75 feet)   PT: Stairs 6 stairs  (with CGA and bilateral rails)   PT Discharge Planning   PT Discharge Recommendation (DC Rec)   (defer to Ortho)   PT Rationale for DC Rec  Anticipate patient will be safe to discharge to home with assist of spouse and OP PT to maximize return to PLOF   PT Brief overview of current status CGA/SBA for gait/transfers with walker; CGA for stairs   [facility]  OUTPATIENT PHYSICAL THERAPY EVALUATION  PLAN OF TREATMENT FOR OUTPATIENT REHABILITATION  (COMPLETE FOR INITIAL CLAIMS ONLY)  Patient's Last Name, First Name, M.I.  YOB: 1955  Layla Lazaro                        Provider's Name  [facility] Medical Record No.  [id number]                             Onset Date:  09/19/23   Start of Care Date:  (P) 09/19/23   Type:     _X_PT   ___OT   ___SLP Medical Diagnosis:  (P) L unicompartmental knee arthroplasty              PT Diagnosis:  (P) impaired functional mobility Visits from SOC:  1     See note for plan of treatment, functional goals and certification details    I CERTIFY THE NEED FOR THESE SERVICES FURNISHED UNDER        THIS PLAN OF TREATMENT AND WHILE UNDER MY CARE     (Physician co-signature of this document indicates review and certification of the therapy plan).

## 2023-09-19 NOTE — PLAN OF CARE
Physical Therapy Discharge Summary    Reason for therapy discharge:    Discharged to home with outpatient therapy.    Progress towards therapy goal(s). See goals on Care Plan in [facility] electronic health record for goal details.  Goals met    Therapy recommendation(s):    Continued therapy is recommended.  Rationale/Recommendations:  OP PT to maximize knee ROM/strength.

## 2023-09-19 NOTE — PROGRESS NOTES
[facility]  Orthopaedic Surgery Consultation by Jose Juan Torrez M.D.    Layla Lazaro MRN# [id number]   Age: 67 year old YOB: 1955     Requesting physician: Yecenia Marquez     Background history:  DX:  Hemochromatosis    TREATMENTS:  3/14/2023, right knee unicompartmental arthroplasty, Dr. Torrez [facility]  9/19/2023, left knee unicompartmental arthroplasty, Dr. Torrez [facility]           History of Present Illness:   67 year old female who presents to clinic today approximately 2 weeks status post right knee unicompartmental arthroplasty.  Her pain is well controlled with Tylenol and ibuprofen.  She states she has some discomfort at night and has difficulty sleeping.  She made good progress in physical therapy.  She is ambulating with a cane.  She is taking aspirin for DVT prophylaxis.  She denies any calf pain, shortness of breath or chest pain.    Social:   Occupation: retired  Living situation: lives with spouse  Hobbies / Sports: walking    Smoking: No  Alcohol: Yes  Illicit drug use: No         Physical Exam:     EXAMINATION pertinent findings:   PSYCH: Pleasant, healthy-appearing, alert, oriented x3, cooperative. Normal mood and affect.  VITAL SIGNS: not currently breastfeeding.  Reviewed nursing intake notes.   There is no height or weight on file to calculate BMI.  RESP: non labored breathing   ABD: benign, soft, non-tender, no acute peritoneal findings  SKIN: grossly normal   LYMPHATIC: grossly normal, no adenopathy, no extremity edema  NEURO: grossly normal , no motor deficits  VASCULAR: satisfactory perfusion of all extremities   MUSCULOSKELETAL:   Alignment: Varus alignment  Gait: Normal gait.    L knee: The incision is clean dry and intact with no erythema, dehiscence, discharge.  Diffuse ecchymosis noted along calf and foot.  Moderate joint effusion noted.  -0-0  .  Deep flexion is recognizably painful.  Straight leg  raise +.  Effusion  Some .  Calves are soft nontender with negative Homans' sign    Right LE:   Thigh and leg compartments soft and compressible   +Quad/TA/GSC/FHL/EHL   SILT DP/SP/Sarah/Saph/Tib nerve distributions   Palpable dorsalis pedis pulse          Data:   All laboratory data reviewed  All imaging studies reviewed by me personally.           Assessment and Plan:   Assessment:  67-year-old female with history of chronic left knee pain due to end-stage osteoarthritic changes most notably in medial compartment who was insufficiently responding to nonsurgical treatment options therefore underwent a left knee unicompartmental arthroplasty on 9/19/2023.  Recovering appropriately     Plan:  I extensively discussed my findings with the patient.  We discussed the intraoperative findings and today's findings.  Patient will continue to work with physical therapy on range of motion, strengthening and gait training.  Patient will continue their DVT prophylaxis until 4 weeks postoperatively.  Suture tails were removed.  She can now shower but should avoid submersing for 3 months.  I recommended compression, ice and elevation for joint effusion.  All questions were answered.  Patient understands and agrees to the treatment plan as set forth.  We will follow-up with patient at the 6-week postoperative date with renewed radiographic imaging studies.    Tyshawn Page PA-C  Physician Assistant   Oncology and Adult Reconstructive Surgery  Dept Orthopaedic Surgery, [facility]       DATA for DOCUMENTATION:         Past Medical History:   There is no problem list on file for this patient.    Past Medical History:   Diagnosis Date    Colon polyps     Heart murmur     Hemochromatosis     gives blood every 3 mo       Also see scanned health assessment forms.       Past Surgical History:     Past Surgical History:   Procedure Laterality Date    ARTHROPLASTY KNEE UNICOMPARTMENT Right 3/14/2023    Procedure: Right knee  unicompartmental arthroplasty;  Surgeon: Jose Juan Torrez MD;  Location: [facility] OR    COLONOSCOPY      EYE SURGERY      as child    MOUTH SURGERY      wisdom teeth age 17            Social History:     Social History     Socioeconomic History    Marital status:      Spouse name: Not on file    Number of children: Not on file    Years of education: Not on file    Highest education level: Not on file   Occupational History    Not on file   Tobacco Use    Smoking status: Former     Types: Cigarettes     Quit date:      Years since quittin.2    Smokeless tobacco: Never   Substance and Sexual Activity    Alcohol use: Not Currently    Drug use: Never    Sexual activity: Not on file   Other Topics Concern    Parent/sibling w/ CABG, MI or angioplasty before 65F 55M? Not Asked   Social History Narrative    Not on file     Social Determinants of Health     Financial Resource Strain: Not on file   Food Insecurity: Not on file   Transportation Needs: Not on file   Physical Activity: Not on file   Stress: Not on file   Social Connections: Not on file   Intimate Partner Violence: Not on file   Housing Stability: Not on file            Family History:       Family History   Problem Relation Age of Onset    Colon Cancer Father             Medications:     Current Outpatient Medications   Medication Sig    acetaminophen (TYLENOL) 325 MG tablet Take 2 tablets (650 mg) by mouth every 4 hours as needed for other (mild pain)    acetaminophen (TYLENOL) 500 MG tablet Take 1,000 mg by mouth every 6 hours as needed for mild pain    aspirin 81 MG EC tablet Take 1 tablet (81 mg) by mouth 2 times daily    clobetasol (TEMOVATE) 0.05 % external ointment Apply topically as needed    Cyanocobalamin (VITAMIN B 12) 100 MCG LOZG Take 100 mcg by mouth every morning    fexofenadine (ALLEGRA) 180 MG tablet Take 180 mg by mouth every morning    ibuprofen (ADVIL/MOTRIN) 600 MG tablet Take 1 tablet (600 mg) by mouth every 6 hours as needed  for mild pain    oxyCODONE (ROXICODONE) 5 MG tablet Take 1-2 tablets (5-10 mg) by mouth every 4 hours as needed for moderate to severe pain    polyethylene glycol (MIRALAX) 17 g packet Take 17 g by mouth daily    senna-docusate (SENOKOT-S/PERICOLACE) 8.6-50 MG tablet Take 1-2 tablets by mouth 2 times daily Take while on oral narcotics to prevent or treat constipation.    simvastatin (ZOCOR) 40 MG tablet Take 40 mg by mouth every morning    Vitamin D3 (CHOLECALCIFEROL) 25 mcg (1000 units) tablet Take 25 mcg by mouth every morning     No current facility-administered medications for this visit.              Review of Systems:   A comprehensive 10 point review of systems (constitutional, ENT, cardiac, peripheral vascular, lymphatic, respiratory, GI, , Musculoskeletal, skin, Neurological) was performed and found to be negative except as described in this note.     See intake form completed by patient

## 2023-09-19 NOTE — DISCHARGE INSTRUCTIONS
Maximum ibuprofen (Advil) dose from all sources should not exceed 2.5 grams (2,400 mg) per day. You received Toradol, an IV form of Ibuprofen (Motrin) at 11:45 AM.  Do not take any Ibuprofen products until 5:45 PM or after.     Maximum acetaminophen (Tylenol) dose from all sources should not exceed 4 grams (4000 mg) per day.  You last had 975 mg at 11:40 AM; do not take Tylenol products again until after 5:40 PM if needed.    DR. JOSÉ LUIS BRVAO M.D.  [facility] PHONE NUMBER:   [phone]  [place] [facility]       GENERAL ANESTHESIA OR SEDATION ADULT DISCHARGE INSTRUCTIONS   SPECIAL PRECAUTIONS FOR 24 HOURS AFTER SURGERY    IT IS NOT UNUSUAL TO FEEL LIGHT-HEADED OR FAINT, UP TO 24 HOURS AFTER SURGERY OR WHILE TAKING PAIN MEDICATION.  IF YOU HAVE THESE SYMPTOMS; SIT FOR A FEW MINUTES BEFORE STANDING AND HAVE SOMEONE ASSIST YOU WHEN YOU GET UP TO WALK OR USE THE BATHROOM.    YOU SHOULD REST AND RELAX FOR THE NEXT 24 HOURS AND YOU MUST MAKE ARRANGEMENTS TO HAVE SOMEONE STAY WITH YOU FOR AT LEAST 24 HOURS AFTER YOUR DISCHARGE.  AVOID HAZARDOUS AND STRENUOUS ACTIVITIES.  DO NOT MAKE IMPORTANT DECISIONS FOR 24 HOURS.    DO NOT DRIVE ANY VEHICLE OR OPERATE MECHANICAL EQUIPMENT FOR 24 HOURS FOLLOWING THE END OF YOUR SURGERY.  EVEN THOUGH YOU MAY FEEL NORMAL, YOUR REACTIONS MAY BE AFFECTED BY THE MEDICATION YOU HAVE RECEIVED.    DO NOT DRINK ALCOHOLIC BEVERAGES FOR 24 HOURS FOLLOWING YOUR SURGERY.    DRINK CLEAR LIQUIDS (APPLE JUICE, GINGER ALE, 7-UP, BROTH, ETC.).  PROGRESS TO YOUR REGULAR DIET AS YOU FEEL ABLE.    YOU MAY HAVE A DRY MOUTH, A SORE THROAT, MUSCLES ACHES OR TROUBLE SLEEPING.  THESE SHOULD GO AWAY AFTER 24 HOURS.    CALL YOUR DOCTOR FOR ANY OF THE FOLLOWING:  SIGNS OF INFECTION (FEVER, GROWING TENDERNESS AT THE SURGERY SITE, A LARGE AMOUNT OF DRAINAGE OR BLEEDING, SEVERE PAIN, FOUL-SMELLING DRAINAGE, REDNESS OR SWELLING.    IT HAS BEEN OVER 8 TO 10 HOURS SINCE SURGERY AND YOU ARE STILL NOT  ABLE TO URINATE (PASS WATER).

## 2023-09-20 NOTE — PROGRESS NOTES
Post op discharge phone call for Dr. Sheldon patients:  [id number]    Type of surgery:uni knee replacement  Date of surgery:9/19/2023    HI,  I am a registered nurse calling from the Orthopedic unit at [facility], checking in to see how you are doing following your Knee surgery.     Is your pain level manageable? yes   Are you having any new or increased numbness or tingling in your surgical leg? no  Are you having increased swelling  or drainage around your surgical site or surgical dressing?no  Have you been able to walk short distances around your house?yes  Have you been able to urinate since you have been home?yes  Do you have your first physical therapy session set up?yes  Do you have any questions or concerns at this time?no    Please call Dr. Sheldon's office ([phone]) of any concerns as you daily review your stoplight tool for symptom management.  Thank you, have a great recovery!

## 2023-09-21 ENCOUNTER — TRANSFERRED RECORDS (OUTPATIENT)
Dept: HEALTH INFORMATION MANAGEMENT | Facility: CLINIC | Age: 68
End: 2023-09-21

## 2023-09-21 ENCOUNTER — TELEPHONE (OUTPATIENT)
Dept: ORTHOPEDICS | Facility: CLINIC | Age: 68
End: 2023-09-21
Payer: COMMERCIAL

## 2023-09-21 DIAGNOSIS — Z96.651 STATUS POST UNICOMPARTMENTAL KNEE REPLACEMENT, RIGHT: Primary | ICD-10-CM

## 2023-09-21 NOTE — TELEPHONE ENCOUNTER
[facility] in [place] called requesting a PT order be faxed to them at fax#: [phone] in regards to patient's recent surgery with Dr. Torrez.     Linda Spain MSA, ATC  Certified Athletic Trainer

## 2023-09-25 NOTE — TELEPHONE ENCOUNTER
Patient orders faxed to [facility]: [phone]. Confirmed they went through via [employer].     JAZZY Bergman RN

## 2023-10-02 ENCOUNTER — OFFICE VISIT (OUTPATIENT)
Dept: ORTHOPEDICS | Facility: CLINIC | Age: 68
End: 2023-10-02
Payer: COMMERCIAL

## 2023-10-02 DIAGNOSIS — Z96.652 STATUS POST UNICOMPARTMENTAL KNEE REPLACEMENT, LEFT: Primary | ICD-10-CM

## 2023-10-02 PROCEDURE — 99024 POSTOP FOLLOW-UP VISIT: CPT | Performed by: PHYSICIAN ASSISTANT

## 2023-10-02 NOTE — LETTER
10/2/2023         RE: Layla Lazaro  [address] [address]        Dear Colleague,    Thank you for referring your patient, Layla Lazaro, to the [facility] [place]. Please see a copy of my visit note below.        [facility]  Orthopaedic Surgery Consultation by Jose Juan Torrez M.D.    Layla Lazaro MRN# [id number]   Age: 67 year old YOB: 1955     Requesting physician: Yecenia Marquez     Background history:  DX:  Hemochromatosis    TREATMENTS:  3/14/2023, right knee unicompartmental arthroplasty, Dr. Torrez [facility]  9/19/2023, left knee unicompartmental arthroplasty, Dr. Torrez [facility]           History of Present Illness:   67 year old female who presents to clinic today approximately 2 weeks status post right knee unicompartmental arthroplasty.  Her pain is well controlled with Tylenol and ibuprofen.  She states she has some discomfort at night and has difficulty sleeping.  She made good progress in physical therapy.  She is ambulating with a cane.  She is taking aspirin for DVT prophylaxis.  She denies any calf pain, shortness of breath or chest pain.    Social:   Occupation: retired  Living situation: lives with spouse  Hobbies / Sports: walking    Smoking: No  Alcohol: Yes  Illicit drug use: No         Physical Exam:     EXAMINATION pertinent findings:   PSYCH: Pleasant, healthy-appearing, alert, oriented x3, cooperative. Normal mood and affect.  VITAL SIGNS: not currently breastfeeding.  Reviewed nursing intake notes.   There is no height or weight on file to calculate BMI.  RESP: non labored breathing   ABD: benign, soft, non-tender, no acute peritoneal findings  SKIN: grossly normal   LYMPHATIC: grossly normal, no adenopathy, no extremity edema  NEURO: grossly normal , no motor deficits  VASCULAR: satisfactory perfusion of all extremities   MUSCULOSKELETAL:    Alignment: Varus alignment  Gait: Normal gait.    L knee: The incision is clean dry and intact with no erythema, dehiscence, discharge.  Diffuse ecchymosis noted along calf and foot.  Moderate joint effusion noted.  -0-0  .  Deep flexion is recognizably painful.  Straight leg raise +.  Effusion  Some .  Calves are soft nontender with negative Homans' sign    Right LE:   Thigh and leg compartments soft and compressible   +Quad/TA/GSC/FHL/EHL   SILT DP/SP/Sarah/Saph/Tib nerve distributions   Palpable dorsalis pedis pulse          Data:   All laboratory data reviewed  All imaging studies reviewed by me personally.           Assessment and Plan:   Assessment:  67-year-old female with history of chronic left knee pain due to end-stage osteoarthritic changes most notably in medial compartment who was insufficiently responding to nonsurgical treatment options therefore underwent a left knee unicompartmental arthroplasty on 9/19/2023.  Recovering appropriately     Plan:  I extensively discussed my findings with the patient.  We discussed the intraoperative findings and today's findings.  Patient will continue to work with physical therapy on range of motion, strengthening and gait training.  Patient will continue their DVT prophylaxis until 4 weeks postoperatively.  Suture tails were removed.  She can now shower but should avoid submersing for 3 months.  I recommended compression, ice and elevation for joint effusion.  All questions were answered.  Patient understands and agrees to the treatment plan as set forth.  We will follow-up with patient at the 6-week postoperative date with renewed radiographic imaging studies.    Tyshawn Page PA-C  Physician Assistant   Oncology and Adult Reconstructive Surgery  Dept Orthopaedic Surgery, [facility]       DATA for DOCUMENTATION:         Past Medical History:   There is no problem list on file for this patient.    Past Medical History:   Diagnosis Date    Colon  polyps     Heart murmur     Hemochromatosis     gives blood every 3 mo       Also see scanned health assessment forms.       Past Surgical History:     Past Surgical History:   Procedure Laterality Date    ARTHROPLASTY KNEE UNICOMPARTMENT Right 3/14/2023    Procedure: Right knee unicompartmental arthroplasty;  Surgeon: Jose Juan Torrez MD;  Location: [facility] OR    COLONOSCOPY      EYE SURGERY      as child    MOUTH SURGERY      wisdom teeth age 17            Social History:     Social History     Socioeconomic History    Marital status:      Spouse name: Not on file    Number of children: Not on file    Years of education: Not on file    Highest education level: Not on file   Occupational History    Not on file   Tobacco Use    Smoking status: Former     Types: Cigarettes     Quit date:      Years since quittin.2    Smokeless tobacco: Never   Substance and Sexual Activity    Alcohol use: Not Currently    Drug use: Never    Sexual activity: Not on file   Other Topics Concern    Parent/sibling w/ CABG, MI or angioplasty before 65F 55M? Not Asked   Social History Narrative    Not on file     Social Determinants of Health     Financial Resource Strain: Not on file   Food Insecurity: Not on file   Transportation Needs: Not on file   Physical Activity: Not on file   Stress: Not on file   Social Connections: Not on file   Intimate Partner Violence: Not on file   Housing Stability: Not on file            Family History:       Family History   Problem Relation Age of Onset    Colon Cancer Father             Medications:     Current Outpatient Medications   Medication Sig    acetaminophen (TYLENOL) 325 MG tablet Take 2 tablets (650 mg) by mouth every 4 hours as needed for other (mild pain)    acetaminophen (TYLENOL) 500 MG tablet Take 1,000 mg by mouth every 6 hours as needed for mild pain    aspirin 81 MG EC tablet Take 1 tablet (81 mg) by mouth 2 times daily    clobetasol (TEMOVATE) 0.05 % external ointment  Apply topically as needed    Cyanocobalamin (VITAMIN B 12) 100 MCG LOZG Take 100 mcg by mouth every morning    fexofenadine (ALLEGRA) 180 MG tablet Take 180 mg by mouth every morning    ibuprofen (ADVIL/MOTRIN) 600 MG tablet Take 1 tablet (600 mg) by mouth every 6 hours as needed for mild pain    oxyCODONE (ROXICODONE) 5 MG tablet Take 1-2 tablets (5-10 mg) by mouth every 4 hours as needed for moderate to severe pain    polyethylene glycol (MIRALAX) 17 g packet Take 17 g by mouth daily    senna-docusate (SENOKOT-S/PERICOLACE) 8.6-50 MG tablet Take 1-2 tablets by mouth 2 times daily Take while on oral narcotics to prevent or treat constipation.    simvastatin (ZOCOR) 40 MG tablet Take 40 mg by mouth every morning    Vitamin D3 (CHOLECALCIFEROL) 25 mcg (1000 units) tablet Take 25 mcg by mouth every morning     No current facility-administered medications for this visit.              Review of Systems:   A comprehensive 10 point review of systems (constitutional, ENT, cardiac, peripheral vascular, lymphatic, respiratory, GI, , Musculoskeletal, skin, Neurological) was performed and found to be negative except as described in this note.     See intake form completed by patient        Tyshawn Page PA-C

## 2023-10-19 ENCOUNTER — TELEPHONE (OUTPATIENT)
Dept: ORTHOPEDICS | Facility: CLINIC | Age: 68
End: 2023-10-19
Payer: COMMERCIAL

## 2023-10-19 NOTE — TELEPHONE ENCOUNTER
PT plan of care received from VIVI Rehman. Document placed in provider basket in [place] for review/ signature.     Linda Spain MSA, ATC  Certified Athletic Trainer

## 2023-10-25 ASSESSMENT — KOOS JR
RISING FROM SITTING: MILD
TWISING OR PIVOTING ON KNEE: SEVERE
KOOS JR SCORING: 57.14
STRAIGHTENING KNEE FULLY: MILD
HOW SEVERE IS YOUR KNEE STIFFNESS AFTER FIRST WAKING IN MORNING: MODERATE
STANDING UPRIGHT: MILD
BENDING TO THE FLOOR TO PICK UP OBJECT: MILD
GOING UP OR DOWN STAIRS: SEVERE

## 2023-10-26 ENCOUNTER — TRANSFERRED RECORDS (OUTPATIENT)
Dept: HEALTH INFORMATION MANAGEMENT | Facility: CLINIC | Age: 68
End: 2023-10-26
Payer: COMMERCIAL

## 2023-10-31 ENCOUNTER — TELEPHONE (OUTPATIENT)
Dept: ORTHOPEDICS | Facility: CLINIC | Age: 68
End: 2023-10-31
Payer: COMMERCIAL

## 2023-10-31 NOTE — TELEPHONE ENCOUNTER
Received a physical therapy re certification form received from [facility].     Form was stamped and faxed back to [phone].    Erin Barreto, MEDINA, LAT, ATC  Certified Athletic Trainer

## 2023-11-01 ENCOUNTER — ANCILLARY PROCEDURE (OUTPATIENT)
Dept: GENERAL RADIOLOGY | Facility: CLINIC | Age: 68
End: 2023-11-01
Attending: STUDENT IN AN ORGANIZED HEALTH CARE EDUCATION/TRAINING PROGRAM
Payer: COMMERCIAL

## 2023-11-01 ENCOUNTER — OFFICE VISIT (OUTPATIENT)
Dept: ORTHOPEDICS | Facility: CLINIC | Age: 68
End: 2023-11-01
Payer: COMMERCIAL

## 2023-11-01 VITALS — SYSTOLIC BLOOD PRESSURE: 133 MMHG | DIASTOLIC BLOOD PRESSURE: 76 MMHG | HEIGHT: 64 IN | BODY MASS INDEX: 25.87 KG/M2

## 2023-11-01 DIAGNOSIS — Z96.652 STATUS POST LEFT UNICOMPARTMENTAL KNEE REPLACEMENT: ICD-10-CM

## 2023-11-01 DIAGNOSIS — Z96.651 STATUS POST UNICOMPARTMENTAL KNEE REPLACEMENT, RIGHT: ICD-10-CM

## 2023-11-01 DIAGNOSIS — Z47.89 ORTHOPEDIC AFTERCARE: ICD-10-CM

## 2023-11-01 DIAGNOSIS — Z47.89 ORTHOPEDIC AFTERCARE: Primary | ICD-10-CM

## 2023-11-01 PROCEDURE — 99024 POSTOP FOLLOW-UP VISIT: CPT | Performed by: STUDENT IN AN ORGANIZED HEALTH CARE EDUCATION/TRAINING PROGRAM

## 2023-11-01 PROCEDURE — 73562 X-RAY EXAM OF KNEE 3: CPT | Mod: TC | Performed by: RADIOLOGY

## 2023-11-01 ASSESSMENT — PAIN SCALES - GENERAL: PAINLEVEL: MILD PAIN (3)

## 2023-11-01 NOTE — LETTER
11/1/2023         RE: Layla Lazaro  [address] [address]        Dear Colleague,    Thank you for referring your patient, Layla Lazaro, to the [facility] [place]. Please see a copy of my visit note below.        [facility]  Orthopaedic Surgery Consultation by Jose Juan Torrez M.D.    Layla Lazaro MRN# [id number]   Age: 67 year old YOB: 1955     Requesting physician: Yecenia Marquez     Background history:  DX:  Hemochromatosis    TREATMENTS:  3/14/2023, right knee unicompartmental arthroplasty, Dr. Torrez [facility]  9/19/2023, left knee unicompartmental arthroplasty, Dr. Torrez, [facility]           History of Present Illness:     11/1/23: Patient was last seen 10/2/23 by Tyshawn Page PA-C. [facility] [place]. She is working on home exercise program, icing and taking Tylenol. She describes constant discomfort, no swelling or pain.     67 year old female who presents to clinic today approximately 6 weeks status post right knee unicompartmental arthroplasty.  Her discomfort is well controlled with Tylenol.  She states she is not in any pain.  She made good progress in physical therapy.  She is ambulating with a cane for stability.  She has completed her aspirin for DVT prophylaxis.  The incision is healed well without any signs of infection.  She denies any calf pain, shortness of breath or chest pain.    Social:   Occupation: retired  Living situation: lives with spouse  Hobbies / Sports: walking    Smoking: No  Alcohol: Yes  Illicit drug use: No         Physical Exam:     EXAMINATION pertinent findings:   PSYCH: Pleasant, healthy-appearing, alert, oriented x3, cooperative. Normal mood and affect.  VITAL SIGNS: not currently breastfeeding.  Reviewed nursing intake notes.   There is no height or weight on file to calculate  BMI.  RESP: non labored breathing   ABD: benign, soft, non-tender, no acute peritoneal findings  SKIN: grossly normal   LYMPHATIC: grossly normal, no adenopathy, no extremity edema  NEURO: grossly normal , no motor deficits  VASCULAR: satisfactory perfusion of all extremities   MUSCULOSKELETAL:   Alignment: Varus alignment  Gait: Normal gait.    L knee: The incision is clean dry and intact with no erythema, dehiscence, discharge.  Some residual postsurgical joint effusion noted.  -0-0  .   Straight leg raise +.  Ligamentously stable.  Normal patellofemoral tracking.  Calves are soft nontender with negative Homans' sign    Right LE:   Thigh and leg compartments soft and compressible   +Quad/TA/GSC/FHL/EHL   SILT DP/SP/Sarah/Saph/Tib nerve distributions   Palpable dorsalis pedis pulse          Data:   All laboratory data reviewed  All imaging studies reviewed by me personally.    XR knee left 11/1/2023:  My interpretation: Status post placement of left medial uni compartmental knee arthroplasty.  Adequate sizing, orientation and fixation of components.  No signs of immediate postoperative complications.             Assessment and Plan:   Assessment:  67-year-old female with history of chronic left knee pain due to end-stage osteoarthritic changes most notably in medial compartment who was insufficiently responding to nonsurgical treatment options therefore underwent a left knee unicompartmental arthroplasty on 9/19/2023.  Recovering appropriately     Plan:  I extensively discussed my findings with the patient.  Patient appears to be recovering appropriately.  Patient will continue to work with physical therapy on range of motion, strengthening and gait training.  .  All questions were answered.  Patient understands and agrees to the treatment plan as set forth.  We will follow-up with patient at the 1 year postoperative date with renewed radiographic imaging studies.      Jose Juan Torrez MD, PhD    Assistant  Professor Adult Reconstruction  [facility] Department of Orthopaedic Surgery        Again, thank you for allowing me to participate in the care of your patient.        Sincerely,        Jose Juan Torrez MD

## 2023-11-01 NOTE — PATIENT INSTRUCTIONS
1. Orthopedic aftercare    2. Status post unicompartmental knee replacement, right    3. Status post left unicompartmental knee replacement        Follow up with Dr. Torrez in 9 months.    Call my office with any questions or concerns, [phone].

## 2023-11-01 NOTE — PROGRESS NOTES
[facility]  Orthopaedic Surgery Consultation by Jose Juan Torrez M.D.    Layla Lazaro MRN# [id number]   Age: 67 year old YOB: 1955     Requesting physician: Yecenia Marquez     Background history:  DX:  Hemochromatosis    TREATMENTS:  3/14/2023, right knee unicompartmental arthroplasty, Dr. Torrez [facility]  9/19/2023, left knee unicompartmental arthroplasty, Dr. Torrez, [facility]           History of Present Illness:     11/1/23: Patient was last seen 10/2/23 by Tyshawn Paeg PA-C. [facility] [place]. She is working on home exercise program, icing and taking Tylenol. She describes constant discomfort, no swelling or pain.     67 year old female who presents to clinic today approximately 6 weeks status post right knee unicompartmental arthroplasty.  Her discomfort is well controlled with Tylenol.  She states she is not in any pain.  She made good progress in physical therapy.  She is ambulating with a cane for stability.  She has completed her aspirin for DVT prophylaxis.  The incision is healed well without any signs of infection.  She denies any calf pain, shortness of breath or chest pain.    Social:   Occupation: retired  Living situation: lives with spouse  Hobbies / Sports: walking    Smoking: No  Alcohol: Yes  Illicit drug use: No         Physical Exam:     EXAMINATION pertinent findings:   PSYCH: Pleasant, healthy-appearing, alert, oriented x3, cooperative. Normal mood and affect.  VITAL SIGNS: not currently breastfeeding.  Reviewed nursing intake notes.   There is no height or weight on file to calculate BMI.  RESP: non labored breathing   ABD: benign, soft, non-tender, no acute peritoneal findings  SKIN: grossly normal   LYMPHATIC: grossly normal, no adenopathy, no extremity edema  NEURO: grossly normal , no motor deficits  VASCULAR: satisfactory perfusion of all extremities    MUSCULOSKELETAL:   Alignment: Varus alignment  Gait: Normal gait.    L knee: The incision is clean dry and intact with no erythema, dehiscence, discharge.  Some residual postsurgical joint effusion noted.  -0-0  .   Straight leg raise +.  Ligamentously stable.  Normal patellofemoral tracking.  Calves are soft nontender with negative Homans' sign    Right LE:   Thigh and leg compartments soft and compressible   +Quad/TA/GSC/FHL/EHL   SILT DP/SP/Sarah/Saph/Tib nerve distributions   Palpable dorsalis pedis pulse          Data:   All laboratory data reviewed  All imaging studies reviewed by me personally.    XR knee left 11/1/2023:  My interpretation: Status post placement of left medial uni compartmental knee arthroplasty.  Adequate sizing, orientation and fixation of components.  No signs of immediate postoperative complications.             Assessment and Plan:   Assessment:  67-year-old female with history of chronic left knee pain due to end-stage osteoarthritic changes most notably in medial compartment who was insufficiently responding to nonsurgical treatment options therefore underwent a left knee unicompartmental arthroplasty on 9/19/2023.  Recovering appropriately     Plan:  I extensively discussed my findings with the patient.  Patient appears to be recovering appropriately.  Patient will continue to work with physical therapy on range of motion, strengthening and gait training.  .  All questions were answered.  Patient understands and agrees to the treatment plan as set forth.  We will follow-up with patient at the 1 year postoperative date with renewed radiographic imaging studies.      Jose Juan Torrez MD, PhD     Adult Reconstruction  [facility] Department of Orthopaedic Surgery

## 2023-12-19 ENCOUNTER — TELEPHONE (OUTPATIENT)
Dept: ORTHOPEDICS | Facility: CLINIC | Age: 68
End: 2023-12-19
Payer: COMMERCIAL

## 2023-12-19 NOTE — TELEPHONE ENCOUNTER
[facility] faxed in patient PT discharge update, including patient feeling really hopeful about being done and having the tools to be successful.   A copy was sent for scanning into the chart.    Jeison Nunez ATC

## 2024-03-28 ENCOUNTER — HOSPITAL ENCOUNTER (OUTPATIENT)
Dept: MAMMOGRAPHY | Facility: CLINIC | Age: 69
Discharge: HOME | End: 2024-03-28
Attending: FAMILY MEDICINE | Admitting: FAMILY MEDICINE
Payer: COMMERCIAL

## 2024-03-28 PROCEDURE — 77063 BREAST TOMOSYNTHESIS BI: CPT

## 2024-04-10 ENCOUNTER — TRANSFERRED RECORDS (OUTPATIENT)
Dept: HEALTH INFORMATION MANAGEMENT | Facility: CLINIC | Age: 69
End: 2024-04-10

## 2024-04-10 ENCOUNTER — MEDICAL CORRESPONDENCE (OUTPATIENT)
Dept: HEALTH INFORMATION MANAGEMENT | Facility: CLINIC | Age: 69
End: 2024-04-10
Payer: COMMERCIAL

## 2024-04-10 DIAGNOSIS — I35.0 AORTIC STENOSIS: Primary | ICD-10-CM

## 2024-04-10 LAB
CHOLESTEROL (EXTERNAL): 187 MG/DL
HDLC SERPL-MCNC: 74 MG/DL
LDL CHOLESTEROL CALCULATED (EXTERNAL): 96 MG/DL
NON HDL CHOLESTEROL (EXTERNAL): 113 MG/DL
TRIGLYCERIDES (EXTERNAL): 84 MG/DL

## 2024-04-17 ENCOUNTER — MEDICAL CORRESPONDENCE (OUTPATIENT)
Dept: HEALTH INFORMATION MANAGEMENT | Facility: CLINIC | Age: 69
End: 2024-04-17
Payer: COMMERCIAL

## 2024-04-17 DIAGNOSIS — E78.5 HYPERLIPEMIA: Primary | ICD-10-CM

## 2024-04-30 ENCOUNTER — HOSPITAL ENCOUNTER (OUTPATIENT)
Dept: CARDIOLOGY | Facility: CLINIC | Age: 69
Discharge: HOME | End: 2024-04-30
Attending: FAMILY MEDICINE | Admitting: FAMILY MEDICINE
Payer: COMMERCIAL

## 2024-04-30 DIAGNOSIS — I35.0 AORTIC STENOSIS: ICD-10-CM

## 2024-04-30 LAB — LVEF ECHO: NORMAL

## 2024-04-30 PROCEDURE — 93306 TTE W/DOPPLER COMPLETE: CPT

## 2024-04-30 PROCEDURE — 93306 TTE W/DOPPLER COMPLETE: CPT | Mod: 26 | Performed by: INTERNAL MEDICINE

## 2024-05-08 ENCOUNTER — HOSPITAL ENCOUNTER (OUTPATIENT)
Dept: CARDIOLOGY | Facility: CLINIC | Age: 69
Discharge: HOME | End: 2024-05-08
Attending: FAMILY MEDICINE | Admitting: FAMILY MEDICINE
Payer: COMMERCIAL

## 2024-05-08 DIAGNOSIS — E78.5 HYPERLIPEMIA: ICD-10-CM

## 2024-05-08 PROCEDURE — 75571 CT HRT W/O DYE W/CA TEST: CPT | Mod: 26 | Performed by: INTERNAL MEDICINE

## 2024-05-08 PROCEDURE — 75571 CT HRT W/O DYE W/CA TEST: CPT

## 2024-05-31 ASSESSMENT — KOOS JR
GOING UP OR DOWN STAIRS: MILD
KOOS JR SCORING: 84.6
HOW SEVERE IS YOUR KNEE STIFFNESS AFTER FIRST WAKING IN MORNING: MILD

## 2024-06-02 ENCOUNTER — HEALTH MAINTENANCE LETTER (OUTPATIENT)
Age: 69
End: 2024-06-02

## 2024-06-05 ENCOUNTER — ANCILLARY PROCEDURE (OUTPATIENT)
Dept: GENERAL RADIOLOGY | Facility: CLINIC | Age: 69
End: 2024-06-05
Attending: STUDENT IN AN ORGANIZED HEALTH CARE EDUCATION/TRAINING PROGRAM
Payer: COMMERCIAL

## 2024-06-05 ENCOUNTER — OFFICE VISIT (OUTPATIENT)
Dept: ORTHOPEDICS | Facility: CLINIC | Age: 69
End: 2024-06-05
Payer: COMMERCIAL

## 2024-06-05 VITALS
HEIGHT: 64 IN | DIASTOLIC BLOOD PRESSURE: 75 MMHG | WEIGHT: 150 LBS | SYSTOLIC BLOOD PRESSURE: 126 MMHG | BODY MASS INDEX: 25.61 KG/M2

## 2024-06-05 DIAGNOSIS — Z96.652 S/P LEFT UNICOMPARTMENTAL KNEE REPLACEMENT: ICD-10-CM

## 2024-06-05 DIAGNOSIS — Z96.651 S/P RIGHT UNICOMPARTMENTAL KNEE REPLACEMENT: Primary | ICD-10-CM

## 2024-06-05 DIAGNOSIS — Z47.89 ORTHOPEDIC AFTERCARE: ICD-10-CM

## 2024-06-05 PROCEDURE — 99213 OFFICE O/P EST LOW 20 MIN: CPT | Performed by: STUDENT IN AN ORGANIZED HEALTH CARE EDUCATION/TRAINING PROGRAM

## 2024-06-05 PROCEDURE — 73562 X-RAY EXAM OF KNEE 3: CPT | Mod: TC | Performed by: RADIOLOGY

## 2024-06-05 NOTE — PROGRESS NOTES
"    [facility]  Orthopaedic Surgery Consultation by Jose Juan Torrez M.D.    Layla Lazaro MRN# [id number]   Age: 67 year old YOB: 1955     Requesting physician: Yecenia Marquez     Background history:  DX:  Hemochromatosis    TREATMENTS:  3/14/2023, right knee unicompartmental arthroplasty, Dr. Torrez [facility]  9/19/2023, left knee unicompartmental arthroplasty, Dr. Torrez [facility]           History of Present Illness:     69 year old female who presents to clinic today approximately 15 months status post right knee unicompartmental arthroplasty and 9 months after left medial unicompartmental knee arthroplasty.  Patient states she is doing very well.  She is very happy with the results.  She states she is not in any pain.  Both her presurgical and postsurgical pain is gone.  She is not taking any pain medication.  She ambulates without assistive devices.  She walks 2-3 miles every day.  She does her home exercise regimen for strengthening and range of motion.  Incisions have healed well without signs of infection.      Social:   Occupation: retired  Living situation: lives with spouse  Hobbies / Sports: walking    Smoking: No  Alcohol: Yes  Illicit drug use: No         Physical Exam:     EXAMINATION pertinent findings:   PSYCH: Pleasant, healthy-appearing, alert, oriented x3, cooperative. Normal mood and affect.  VITAL SIGNS: Blood pressure 126/75, height 1.626 m (5' 4\"), weight 68 kg (150 lb), not currently breastfeeding.  Reviewed nursing intake notes.   Body mass index is 25.75 kg/m .  RESP: non labored breathing   ABD: benign, soft, non-tender, no acute peritoneal findings  SKIN: grossly normal   LYMPHATIC: grossly normal, no adenopathy, no extremity edema  NEURO: grossly normal , no motor deficits  VASCULAR: satisfactory perfusion of all extremities   MUSCULOSKELETAL:   Alignment: Varus alignment  Gait: Normal gait.    L knee: The incision " is clean dry and intact with no erythema, dehiscence, discharge.  Well-healed.  No significant effusion present.  -0-0  .   Straight leg raise +.  Ligamentously stable.  Normal patellofemoral tracking.  Calves are soft nontender with negative Homans' sign    R knee: he incision is clean dry and intact with no erythema, dehiscence, discharge.  Well-healed.  No significant effusion present.  -0-0  .   Straight leg raise +.  Ligamentously stable.  Normal patellofemoral tracking.  Calves are soft nontender with negative Homans' sign    Bilateral LE:   Thigh and leg compartments soft and compressible   +Quad/TA/GSC/FHL/EHL   SILT DP/SP/Sarah/Saph/Tib nerve distributions   Palpable dorsalis pedis pulse          Data:   All laboratory data reviewed  All imaging studies reviewed by me personally.    XR knee bilateral 6/5/2024:  My interpretation: Status post bilateral placement of medial uni compartmental knee arthroplasty.  Adequate sizing, orientation and fixation of components.  No signs of complications complications.             Assessment and Plan:   Assessment:  69 year old female who presents to clinic today approximately 15 months status post right knee unicompartmental arthroplasty and 9 months after left medial unicompartmental knee arthroplasty.  Recovered well.     Plan:  I extensively discussed my findings with the patient.  Patient recovering well.  There is currently no reason for concern or further interventions.  I recommended she continue with her home exercise regimen.   All questions were answered.  Patient understands and agrees to the treatment plan as set forth.  We will follow-up with patient at the 5 year postoperative date with renewed radiographic imaging studies or anytime sooner when questions or concerns may arise.      Jose Juan Torrez MD, PhD     Adult Reconstruction  [facility] Department of Orthopaedic Surgery

## 2024-06-05 NOTE — LETTER
"6/5/2024      Layla Lazaro  [address] [address]      Dear Colleague,    Thank you for referring your patient, Layla Lazaro, to the [facility] [place]. Please see a copy of my visit note below.        [facility]  Orthopaedic Surgery Consultation by Jose Juan Torrez M.D.    Layla Lazaro MRN# [id number]   Age: 67 year old YOB: 1955     Requesting physician: Yecenia Marquez     Background history:  DX:  Hemochromatosis    TREATMENTS:  3/14/2023, right knee unicompartmental arthroplasty, Dr. Torrez [facility]  9/19/2023, left knee unicompartmental arthroplasty, Dr. Torrez [facility]           History of Present Illness:     69 year old female who presents to clinic today approximately 15 months status post right knee unicompartmental arthroplasty and 9 months after left medial unicompartmental knee arthroplasty.  Patient states she is doing very well.  She is very happy with the results.  She states she is not in any pain.  Both her presurgical and postsurgical pain is gone.  She is not taking any pain medication.  She ambulates without assistive devices.  She walks 2-3 miles every day.  She does her home exercise regimen for strengthening and range of motion.  Incisions have healed well without signs of infection.      Social:   Occupation: retired  Living situation: lives with spouse  Hobbies / Sports: walking    Smoking: No  Alcohol: Yes  Illicit drug use: No         Physical Exam:     EXAMINATION pertinent findings:   PSYCH: Pleasant, healthy-appearing, alert, oriented x3, cooperative. Normal mood and affect.  VITAL SIGNS: Blood pressure 126/75, height 1.626 m (5' 4\"), weight 68 kg (150 lb), not currently breastfeeding.  Reviewed nursing intake notes.   Body mass index is 25.75 kg/m .  RESP: non labored breathing   ABD: benign, soft, non-tender, no acute peritoneal " findings  SKIN: grossly normal   LYMPHATIC: grossly normal, no adenopathy, no extremity edema  NEURO: grossly normal , no motor deficits  VASCULAR: satisfactory perfusion of all extremities   MUSCULOSKELETAL:   Alignment: Varus alignment  Gait: Normal gait.    L knee: The incision is clean dry and intact with no erythema, dehiscence, discharge.  Well-healed.  No significant effusion present.  -0-0  .   Straight leg raise +.  Ligamentously stable.  Normal patellofemoral tracking.  Calves are soft nontender with negative Homans' sign    R knee: he incision is clean dry and intact with no erythema, dehiscence, discharge.  Well-healed.  No significant effusion present.  -0-0  .   Straight leg raise +.  Ligamentously stable.  Normal patellofemoral tracking.  Calves are soft nontender with negative Homans' sign    Bilateral LE:   Thigh and leg compartments soft and compressible   +Quad/TA/GSC/FHL/EHL   SILT DP/SP/Sarah/Saph/Tib nerve distributions   Palpable dorsalis pedis pulse          Data:   All laboratory data reviewed  All imaging studies reviewed by me personally.    XR knee bilateral 6/5/2024:  My interpretation: Status post bilateral placement of medial uni compartmental knee arthroplasty.  Adequate sizing, orientation and fixation of components.  No signs of complications complications.             Assessment and Plan:   Assessment:  69 year old female who presents to clinic today approximately 15 months status post right knee unicompartmental arthroplasty and 9 months after left medial unicompartmental knee arthroplasty.  Recovered well.     Plan:  I extensively discussed my findings with the patient.  Patient recovering well.  There is currently no reason for concern or further interventions.  I recommended she continue with her home exercise regimen.   All questions were answered.  Patient understands and agrees to the treatment plan as set forth.  We will follow-up with patient at the 5 year  postoperative date with renewed radiographic imaging studies or anytime sooner when questions or concerns may arise.      Jose Juan Torrez MD, PhD     Adult Reconstruction  [facility] Department of Orthopaedic Surgery        Again, thank you for allowing me to participate in the care of your patient.        Sincerely,        Jose Juan Torrez MD

## 2024-06-14 ENCOUNTER — OFFICE VISIT (OUTPATIENT)
Dept: CARDIOLOGY | Facility: CLINIC | Age: 69
End: 2024-06-14
Payer: COMMERCIAL

## 2024-06-14 VITALS
BODY MASS INDEX: 26.98 KG/M2 | HEIGHT: 64 IN | DIASTOLIC BLOOD PRESSURE: 82 MMHG | SYSTOLIC BLOOD PRESSURE: 120 MMHG | WEIGHT: 158 LBS | OXYGEN SATURATION: 98 % | HEART RATE: 62 BPM

## 2024-06-14 DIAGNOSIS — Z13.6 SCREENING FOR CARDIOVASCULAR CONDITION: Primary | ICD-10-CM

## 2024-06-14 DIAGNOSIS — I35.0 NONRHEUMATIC AORTIC VALVE STENOSIS: ICD-10-CM

## 2024-06-14 DIAGNOSIS — I25.10 CORONARY ARTERY DISEASE INVOLVING NATIVE CORONARY ARTERY OF NATIVE HEART, UNSPECIFIED WHETHER ANGINA PRESENT: ICD-10-CM

## 2024-06-14 PROCEDURE — 99204 OFFICE O/P NEW MOD 45 MIN: CPT | Performed by: INTERNAL MEDICINE

## 2024-06-14 PROCEDURE — 93000 ELECTROCARDIOGRAM COMPLETE: CPT | Performed by: INTERNAL MEDICINE

## 2024-06-14 RX ORDER — ROSUVASTATIN CALCIUM 40 MG/1
40 TABLET, COATED ORAL DAILY
COMMUNITY
Start: 2024-05-16

## 2024-06-14 NOTE — PROGRESS NOTES
HPI and Plan:   I the pleasure meeting Ms. Lazaro today at the heart clinic.  She is referred in by Dr. Ricks.  I had a long visit with the patient today it was a 1 hour visit .  The patient has coronary disease based on calcium scoring CAT scan the reader is referred to that test she is at the 93rd percentile therefore in this particular case we will want to see the LDL well below 70 even possibly closer to 50.  I do not have Dr. Ricks's new cholesterol numbers but 2 years ago the LDL was 76.  The patient tells me she is now on high-dose Crestor so if Crestor alone gets her well below 70  Otherwise we could add Zetia    The patient is had a recent orthopedic surgery so I would like to do a stress echo at some point to make sure that at this point there is no flow-limiting lesions.  Clinically she reports no angina she does note some exertional shortness of breath no dizzy spells no syncope.  Organ to wait about 6 months felt her knee is fully healed before doing a stress echo.    Regarding the murmur I looked at the 2 echoes the current and from 2 years ago both of them show no more than moderate aortic stenosis it makes it look like the aortic stenosis got better on the newer echo which obviously is not the case but in any case there is no more than moderate AS.  I am highly suspicious this is a bicuspid aortic valve and I am looking at    I explained to the patient what the bicuspid aortic valve symptoms such as shortness of breath lightheadedness with exercise etc.  She has not probably none of those symptoms but again she has a recent orthopedic procedure so she is not running as much as she would have and will have to make sure when she is well-healed that this mild shortness of breath is not an anginal equivalent or from her her mild to moderate AS    Will see her back in approximately 6 months I did a lot of explaining about how coronary disease works what the CT calcium score means.  I did tell her she  should let her kids know that she might have aortic stenosis on a bicuspid valve basis since there is a small hereditary component to that one of her daughters also has high cholesterol as a separate issue today's visit was 1 hour    Ecg essentially normal iRBBB. LAD  Orders Placed This Encounter   Procedures    Follow-Up with Cardiology    EKG 12-lead complete w/read - Clinics (performed today)    Exercise Stress Echocardiogram     Orders Placed This Encounter   Medications    rosuvastatin (CRESTOR) 40 MG tablet     Sig: Take 40 mg by mouth daily     Medications Discontinued During This Encounter   Medication Reason    acetaminophen (TYLENOL) 325 MG tablet     acetaminophen (TYLENOL) 500 MG tablet     amoxicillin (AMOXIL) 500 MG capsule     aspirin 81 MG EC tablet     Cyanocobalamin (VITAMIN B 12) 100 MCG LOZG     ibuprofen (ADVIL/MOTRIN) 600 MG tablet     oxyCODONE (ROXICODONE) 5 MG tablet     polyethylene glycol (MIRALAX) 17 g packet     senna-docusate (SENOKOT-S/PERICOLACE) 8.6-50 MG tablet     simvastatin (ZOCOR) 40 MG tablet          Encounter Diagnoses   Name Primary?    Screening for cardiovascular condition Yes    Coronary artery disease involving native coronary artery of native heart, unspecified whether angina present     Nonrheumatic aortic valve stenosis        CURRENT MEDICATIONS:  Current Outpatient Medications   Medication Sig Dispense Refill    clobetasol (TEMOVATE) 0.05 % external ointment Apply topically as needed      rosuvastatin (CRESTOR) 40 MG tablet Take 40 mg by mouth daily      vitamin B-Complex Take 1 tablet by mouth Every 5 days      Vitamin D3 (CHOLECALCIFEROL) 25 mcg (1000 units) tablet Take 25 mcg by mouth every morning         ALLERGIES   No Known Allergies    PAST MEDICAL HISTORY:  Past Medical History:   Diagnosis Date    Aortic valve stenosis     possible bicuspid    Colon polyps     Coronary artery disease involving native coronary artery without angina pectoris     based on CT  "calcium score    Hemochromatosis     gives blood every 3 mo    Hyperlipidemia LDL goal <70        PAST SURGICAL HISTORY:  Past Surgical History:   Procedure Laterality Date    ARTHROPLASTY KNEE UNICOMPARTMENT Right 3/14/2023    Procedure: Right knee unicompartmental arthroplasty;  Surgeon: Jose Juan Torrez MD;  Location: [facility]    ARTHROPLASTY KNEE UNICOMPARTMENT Left 2023    Procedure: Left unicompartment knee arthroplasty;  Surgeon: Jose Juan Torrez MD;  Location: [facility]    COLONOSCOPY      EYE SURGERY      as child    MOUTH SURGERY      wisdom teeth age 17       FAMILY HISTORY:  Family History   Problem Relation Age of Onset    Colon Cancer Father     No Known Problems Sister     Hyperlipidemia Other        SOCIAL HISTORY:  Social History     Socioeconomic History    Marital status:      Spouse name: None    Number of children: None    Years of education: None    Highest education level: None   Occupational History    Occupation: retired     Comment: human resources   Tobacco Use    Smoking status: Former     Current packs/day: 0.00     Types: Cigarettes     Quit date:      Years since quittin.4    Smokeless tobacco: Never   Substance and Sexual Activity    Alcohol use: Not Currently    Drug use: Never     Comment: caffeine -0       Review of Systems:  Skin:        Eyes:       ENT:       Respiratory:  Negative    Cardiovascular:    Positive for;fatigue  Gastroenterology:      Genitourinary:       Musculoskeletal:       Neurologic:       Psychiatric:       Heme/Lymph/Imm:       Endocrine:         Physical Exam:  Vitals: /82 (BP Location: Right arm, Patient Position: Sitting, Cuff Size: Adult Regular)   Pulse 62   Ht 1.626 m (5' 4\")   Wt 71.7 kg (158 lb)   LMP  (LMP Unknown)   SpO2 98%   BMI 27.12 kg/m   Orthostatic Vitals from 24 0916 to 24 0916    Date and Time Orthostatic BP Orthostatic Pulse Patient Position BP   Location Cuff Size   24 0844 -- -- Sitting " "Right arm Adult Regular         Constitutional:  cooperative, alert and oriented, well developed, well nourished, in no acute distress        Skin:  warm and dry to the touch, no apparent skin lesions or masses noted          Head:  normocephalic, no masses or lesions        Eyes:  pupils equal and round, conjunctivae and lids unremarkable, sclera white, no xanthalasma, EOMS intact, no nystagmus        Lymph:No Cervical lymphadenopathy present;No thyromegaly     ENT:           Neck:  carotid pulses are full and equal bilaterally, JVP normal, no carotid bruit transmitted murmur mild delay in upstroke carotid (AS)    Respiratory:  normal breath sounds, clear to auscultation, normal A-P diameter, normal symmetry, normal respiratory excursion, no use of accessory muscles         Cardiac: regular rhythm       systolic ejection murmur;grade 3;radiation to the carotid        pulses full and equal, no bruits auscultated                                   slight dec DP pulses bilat    GI:  abdomen soft, non-tender, BS normoactive, no mass, no HSM, no bruits        Extremities and Muscular Skeletal:  no deformities, clubbing, cyanosis, erythema observed;no edema              Neurological:  no gross motor deficits        Psych:  Alert and Oriented x 3      Recent Lab Results:  LIPID RESULTS:  Lab Results   Component Value Date    TRIG 59 03/24/2022       LIVER ENZYME RESULTS:  No results found for: \"AST\", \"ALT\"    CBC RESULTS:  Lab Results   Component Value Date    WBC 7.1 08/21/2023    WBC 8.5 12/28/2010    RBC 4.68 08/21/2023    RBC 4.67 12/28/2010    HGB 14.8 08/21/2023    HGB 15.5 12/28/2010    HCT 44.1 08/21/2023    HCT 44.3 12/28/2010    MCV 94 08/21/2023    MCV 95 12/28/2010    MCH 31.6 08/21/2023    MCH 33.2 (H) 12/28/2010    MCHC 33.6 08/21/2023    MCHC 35.0 12/28/2010    RDW 11.8 08/21/2023    RDW 12.1 12/28/2010     08/21/2023     12/28/2010       BMP RESULTS:  Lab Results   Component Value Date    NA " "140 08/21/2023     12/28/2010    POTASSIUM 4.2 08/21/2023    POTASSIUM 3.8 12/28/2010    CHLORIDE 104 08/21/2023    CHLORIDE 102 12/28/2010    CO2 27 08/21/2023    CO2 27 12/28/2010    ANIONGAP 9 08/21/2023    ANIONGAP 10 12/28/2010    GLC 89 08/21/2023    GLC 96 12/28/2010    BUN 14.3 08/21/2023    BUN 10 12/28/2010    CR 0.62 08/21/2023    CR 0.65 12/28/2010    GFRESTIMATED >90 08/21/2023    GFRESTIMATED >90 12/28/2010    GFRESTBLACK >90 12/28/2010    DANYEL 9.7 08/21/2023    DANYEL 9.6 12/28/2010        A1C RESULTS:  No results found for: \"A1C\"    INR RESULTS:  No results found for: \"INR\"        CC  Yecenia Ricks MD  [address]  [place] [address]  "

## 2024-06-14 NOTE — LETTER
6/14/2024    Yecenia Ricks MD  [address]  [place] [address]    RE: Layla Lazaro       Dear Colleague,     I had the pleasure of seeing Layla Lazaro in the [facility].  HPI and Plan:   I the pleasure meeting Ms. Lazaro today at the heart clinic.  She is referred in by Dr. Ricks.  I had a long visit with the patient today it was a 1 hour visit .  The patient has coronary disease based on calcium scoring CAT scan the reader is referred to that test she is at the 93rd percentile therefore in this particular case we will want to see the LDL well below 70 even possibly closer to 50.  I do not have Dr. Ricks's new cholesterol numbers but 2 years ago the LDL was 76.  The patient tells me she is now on high-dose Crestor so if Crestor alone gets her well below 70  Otherwise we could add Zetia    The patient is had a recent orthopedic surgery so I would like to do a stress echo at some point to make sure that at this point there is no flow-limiting lesions.  Clinically she reports no angina she does note some exertional shortness of breath no dizzy spells no syncope.  Organ to wait about 6 months felt her knee is fully healed before doing a stress echo.    Regarding the murmur I looked at the 2 echoes the current and from 2 years ago both of them show no more than moderate aortic stenosis it makes it look like the aortic stenosis got better on the newer echo which obviously is not the case but in any case there is no more than moderate AS.  I am highly suspicious this is a bicuspid aortic valve and I am looking at    I explained to the patient what the bicuspid aortic valve symptoms such as shortness of breath lightheadedness with exercise etc.  She has not probably none of those symptoms but again she has a recent orthopedic procedure so she is not running as much as she would have and will have to make sure when she is well-healed that this mild shortness of breath is  not an anginal equivalent or from her her mild to moderate AS    Will see her back in approximately 6 months I did a lot of explaining about how coronary disease works what the CT calcium score means.  I did tell her she should let her kids know that she might have aortic stenosis on a bicuspid valve basis since there is a small hereditary component to that one of her daughters also has high cholesterol as a separate issue today's visit was 1 hour    Orders Placed This Encounter   Procedures    Follow-Up with Cardiology    EKG 12-lead complete w/read - Clinics (performed today)    Exercise Stress Echocardiogram     Orders Placed This Encounter   Medications    rosuvastatin (CRESTOR) 40 MG tablet     Sig: Take 40 mg by mouth daily     Medications Discontinued During This Encounter   Medication Reason    acetaminophen (TYLENOL) 325 MG tablet     acetaminophen (TYLENOL) 500 MG tablet     amoxicillin (AMOXIL) 500 MG capsule     aspirin 81 MG EC tablet     Cyanocobalamin (VITAMIN B 12) 100 MCG LOZG     ibuprofen (ADVIL/MOTRIN) 600 MG tablet     oxyCODONE (ROXICODONE) 5 MG tablet     polyethylene glycol (MIRALAX) 17 g packet     senna-docusate (SENOKOT-S/PERICOLACE) 8.6-50 MG tablet     simvastatin (ZOCOR) 40 MG tablet          Encounter Diagnoses   Name Primary?    Screening for cardiovascular condition Yes    Coronary artery disease involving native coronary artery of native heart, unspecified whether angina present     Nonrheumatic aortic valve stenosis        CURRENT MEDICATIONS:  Current Outpatient Medications   Medication Sig Dispense Refill    clobetasol (TEMOVATE) 0.05 % external ointment Apply topically as needed      rosuvastatin (CRESTOR) 40 MG tablet Take 40 mg by mouth daily      vitamin B-Complex Take 1 tablet by mouth Every 5 days      Vitamin D3 (CHOLECALCIFEROL) 25 mcg (1000 units) tablet Take 25 mcg by mouth every morning         ALLERGIES   No Known Allergies    PAST MEDICAL HISTORY:  Past Medical  "History:   Diagnosis Date    Aortic valve stenosis     possible bicuspid    Colon polyps     Coronary artery disease involving native coronary artery without angina pectoris     based on CT calcium score    Hemochromatosis     gives blood every 3 mo    Hyperlipidemia LDL goal <70        PAST SURGICAL HISTORY:  Past Surgical History:   Procedure Laterality Date    ARTHROPLASTY KNEE UNICOMPARTMENT Right 3/14/2023    Procedure: Right knee unicompartmental arthroplasty;  Surgeon: Jose Juan Torrez MD;  Location: [facility]    ARTHROPLASTY KNEE UNICOMPARTMENT Left 2023    Procedure: Left unicompartment knee arthroplasty;  Surgeon: Jose Juan Torrez MD;  Location: [facility]    COLONOSCOPY      EYE SURGERY      as child    MOUTH SURGERY      wisdom teeth age 17       FAMILY HISTORY:  Family History   Problem Relation Age of Onset    Colon Cancer Father     No Known Problems Sister     Hyperlipidemia Other        SOCIAL HISTORY:  Social History     Socioeconomic History    Marital status:      Spouse name: None    Number of children: None    Years of education: None    Highest education level: None   Occupational History    Occupation: retired     Comment: human resources   Tobacco Use    Smoking status: Former     Current packs/day: 0.00     Types: Cigarettes     Quit date:      Years since quittin.4    Smokeless tobacco: Never   Substance and Sexual Activity    Alcohol use: Not Currently    Drug use: Never     Comment: caffeine -0       Review of Systems:  Skin:        Eyes:       ENT:       Respiratory:  Negative    Cardiovascular:    Positive for;fatigue  Gastroenterology:      Genitourinary:       Musculoskeletal:       Neurologic:       Psychiatric:       Heme/Lymph/Imm:       Endocrine:         Physical Exam:  Vitals: /82 (BP Location: Right arm, Patient Position: Sitting, Cuff Size: Adult Regular)   Pulse 62   Ht 1.626 m (5' 4\")   Wt 71.7 kg (158 lb)   LMP  (LMP Unknown)   SpO2 98%   BMI " "27.12 kg/m   Orthostatic Vitals from 06/12/24 0916 to 06/14/24 0916    Date and Time Orthostatic BP Orthostatic Pulse Patient Position BP   Location Cuff Size   06/14/24 0826 -- -- Sitting Right arm Adult Regular         Constitutional:  cooperative, alert and oriented, well developed, well nourished, in no acute distress        Skin:  warm and dry to the touch, no apparent skin lesions or masses noted          Head:  normocephalic, no masses or lesions        Eyes:  pupils equal and round, conjunctivae and lids unremarkable, sclera white, no xanthalasma, EOMS intact, no nystagmus        Lymph:No Cervical lymphadenopathy present;No thyromegaly     ENT:           Neck:  carotid pulses are full and equal bilaterally, JVP normal, no carotid bruit transmitted murmur mild delay in upstroke carotid (AS)    Respiratory:  normal breath sounds, clear to auscultation, normal A-P diameter, normal symmetry, normal respiratory excursion, no use of accessory muscles         Cardiac: regular rhythm       systolic ejection murmur;grade 3;radiation to the carotid        pulses full and equal, no bruits auscultated                                   slight dec DP pulses bilat    GI:  abdomen soft, non-tender, BS normoactive, no mass, no HSM, no bruits        Extremities and Muscular Skeletal:  no deformities, clubbing, cyanosis, erythema observed;no edema              Neurological:  no gross motor deficits        Psych:  Alert and Oriented x 3      Recent Lab Results:  LIPID RESULTS:  Lab Results   Component Value Date    TRIG 59 03/24/2022       LIVER ENZYME RESULTS:  No results found for: \"AST\", \"ALT\"    CBC RESULTS:  Lab Results   Component Value Date    WBC 7.1 08/21/2023    WBC 8.5 12/28/2010    RBC 4.68 08/21/2023    RBC 4.67 12/28/2010    HGB 14.8 08/21/2023    HGB 15.5 12/28/2010    HCT 44.1 08/21/2023    HCT 44.3 12/28/2010    MCV 94 08/21/2023    MCV 95 12/28/2010    MCH 31.6 08/21/2023    MCH 33.2 (H) 12/28/2010    [facility] " "33.6 08/21/2023    MCHC 35.0 12/28/2010    RDW 11.8 08/21/2023    RDW 12.1 12/28/2010     08/21/2023     12/28/2010       BMP RESULTS:  Lab Results   Component Value Date     08/21/2023     12/28/2010    POTASSIUM 4.2 08/21/2023    POTASSIUM 3.8 12/28/2010    CHLORIDE 104 08/21/2023    CHLORIDE 102 12/28/2010    CO2 27 08/21/2023    CO2 27 12/28/2010    ANIONGAP 9 08/21/2023    ANIONGAP 10 12/28/2010    GLC 89 08/21/2023    GLC 96 12/28/2010    BUN 14.3 08/21/2023    BUN 10 12/28/2010    CR 0.62 08/21/2023    CR 0.65 12/28/2010    GFRESTIMATED >90 08/21/2023    GFRESTIMATED >90 12/28/2010    GFRESTBLACK >90 12/28/2010    DANYEL 9.7 08/21/2023    DANYEL 9.6 12/28/2010        A1C RESULTS:  No results found for: \"A1C\"    INR RESULTS:  No results found for: \"INR\"        CC  Yecenia Ricks MD  [address]  [place] [address]      Thank you for allowing me to participate in the care of your patient.      Sincerely,     Nirav Shepard MD     [facility]  "

## 2024-07-02 ENCOUNTER — TRANSFERRED RECORDS (OUTPATIENT)
Dept: HEALTH INFORMATION MANAGEMENT | Facility: CLINIC | Age: 69
End: 2024-07-02

## 2024-10-21 ENCOUNTER — TRANSFERRED RECORDS (OUTPATIENT)
Dept: HEALTH INFORMATION MANAGEMENT | Facility: CLINIC | Age: 69
End: 2024-10-21

## 2024-12-12 ENCOUNTER — HOSPITAL ENCOUNTER (OUTPATIENT)
Dept: CARDIOLOGY | Facility: CLINIC | Age: 69
Discharge: HOME | End: 2024-12-12
Attending: INTERNAL MEDICINE
Payer: COMMERCIAL

## 2024-12-12 DIAGNOSIS — I35.0 NONRHEUMATIC AORTIC VALVE STENOSIS: ICD-10-CM

## 2024-12-12 DIAGNOSIS — I25.10 CORONARY ARTERY DISEASE INVOLVING NATIVE CORONARY ARTERY OF NATIVE HEART, UNSPECIFIED WHETHER ANGINA PRESENT: ICD-10-CM

## 2024-12-12 PROCEDURE — 255N000002 HC RX 255 OP 636: Performed by: INTERNAL MEDICINE

## 2024-12-12 PROCEDURE — C8928 TTE W OR W/O FOL W/CON,STRES: HCPCS

## 2024-12-12 PROCEDURE — 999N000208 ECHO STRESS ECHOCARDIOGRAM

## 2024-12-12 RX ADMIN — HUMAN ALBUMIN MICROSPHERES AND PERFLUTREN 9 ML: 10; .22 INJECTION, SOLUTION INTRAVENOUS at 14:27

## 2024-12-16 ENCOUNTER — OFFICE VISIT (OUTPATIENT)
Dept: CARDIOLOGY | Facility: CLINIC | Age: 69
End: 2024-12-16
Payer: COMMERCIAL

## 2024-12-16 VITALS
OXYGEN SATURATION: 98 % | HEIGHT: 64 IN | BODY MASS INDEX: 26.63 KG/M2 | HEART RATE: 70 BPM | TEMPERATURE: 98 F | WEIGHT: 156 LBS | SYSTOLIC BLOOD PRESSURE: 124 MMHG | DIASTOLIC BLOOD PRESSURE: 76 MMHG

## 2024-12-16 DIAGNOSIS — I25.10 CORONARY ARTERY DISEASE INVOLVING NATIVE CORONARY ARTERY OF NATIVE HEART, UNSPECIFIED WHETHER ANGINA PRESENT: ICD-10-CM

## 2024-12-16 DIAGNOSIS — I35.0 NONRHEUMATIC AORTIC VALVE STENOSIS: ICD-10-CM

## 2024-12-16 RX ORDER — ALENDRONATE SODIUM 70 MG/1
70 TABLET ORAL
COMMUNITY
Start: 2024-10-21

## 2024-12-16 NOTE — PROGRESS NOTES
I had the pleasure of following up with Ms. Lazaro    She has coronary disease based on a high calcium score and high lipids we were able to review her stress echo today her exercise time was good no EKG changes and the echo was normal.  I believe she probably has a bicuspid valve with mild aortic valve stenosis in talking the family I told him to check indeed it turns out her mother actually had a bicuspid valve.  The children are going to be checked.  Also Dr. Ross raise the Crestor dose in her cholesterol numbers are now completely at goal.  Today we reviewed her blood pressure numbers are excellent reviewed the echo and the stress echo and the blood work from Dr. Min's office.  At this point I do not think she is come back for 2 to 3 years she get another echocardiogram to look at the aortic valve and aortic root size fortunately aortic root size is normal.  Today's visit was 30 minutes    No orders of the defined types were placed in this encounter.    Orders Placed This Encounter   Medications    alendronate (FOSAMAX) 70 MG tablet     Sig: Take 70 mg by mouth every 7 days.     There are no discontinued medications.      Encounter Diagnoses   Name Primary?    Coronary artery disease involving native coronary artery of native heart, unspecified whether angina present     Nonrheumatic aortic valve stenosis        CURRENT MEDICATIONS:  Current Outpatient Medications   Medication Sig Dispense Refill    alendronate (FOSAMAX) 70 MG tablet Take 70 mg by mouth every 7 days.      clobetasol (TEMOVATE) 0.05 % external ointment Apply topically as needed      rosuvastatin (CRESTOR) 40 MG tablet Take 40 mg by mouth daily      vitamin B-Complex Take 1 tablet by mouth Every 5 days      Vitamin D3 (CHOLECALCIFEROL) 25 mcg (1000 units) tablet Take 25 mcg by mouth every morning         ALLERGIES   No Known Allergies    PAST MEDICAL HISTORY:  Past Medical History:   Diagnosis Date    Aortic valve stenosis     possible  "bicuspid    Colon polyps     Coronary artery disease involving native coronary artery without angina pectoris     based on CT calcium score    Hemochromatosis     gives blood every 3 mo    Hyperlipidemia LDL goal <70        PAST SURGICAL HISTORY:  Past Surgical History:   Procedure Laterality Date    ARTHROPLASTY KNEE UNICOMPARTMENT Right 3/14/2023    Procedure: Right knee unicompartmental arthroplasty;  Surgeon: Jose Juan Torrez MD;  Location: [facility]    ARTHROPLASTY KNEE UNICOMPARTMENT Left 2023    Procedure: Left unicompartment knee arthroplasty;  Surgeon: Jose Juan Torrez MD;  Location: [facility]    COLONOSCOPY      EYE SURGERY      as child    MOUTH SURGERY      wisdom teeth age 17       FAMILY HISTORY:  Family History   Problem Relation Age of Onset    Colon Cancer Father     No Known Problems Sister     Hyperlipidemia Other        SOCIAL HISTORY:  Social History     Socioeconomic History    Marital status:    Occupational History    Occupation: retired     Comment: [employer]   Tobacco Use    Smoking status: Former     Current packs/day: 0.00     Types: Cigarettes     Quit date:      Years since quittin.9    Smokeless tobacco: Never   Substance and Sexual Activity    Alcohol use: Not Currently    Drug use: Never     Comment: caffeine -0       Review of Systems:  Skin:        Eyes:       ENT:       Respiratory:       Cardiovascular:       Gastroenterology:      Genitourinary:       Musculoskeletal:       Neurologic:       Psychiatric:       Heme/Lymph/Imm:       Endocrine:         Physical Exam:  Vitals: /76   Pulse 70   Temp 98  F (36.7  C)   Ht 1.626 m (5' 4\")   Wt 70.8 kg (156 lb)   LMP  (LMP Unknown)   SpO2 98%   BMI 26.78 kg/m        Constitutional:           Skin:             Head:           Eyes:           Lymph:      ENT:           Neck:           Respiratory:            Cardiac:                                                           GI:           Extremities and " "Muscular Skeletal:                 Neurological:           Psych:         Recent Lab Results:  LIPID RESULTS:  Lab Results   Component Value Date    TRIG 49 07/02/2024       LIVER ENZYME RESULTS:  No results found for: \"AST\", \"ALT\"    CBC RESULTS:  Lab Results   Component Value Date    WBC 7.1 08/21/2023    WBC 8.5 12/28/2010    RBC 4.68 08/21/2023    RBC 4.67 12/28/2010    HGB 14.8 08/21/2023    HGB 15.5 12/28/2010    HCT 44.1 08/21/2023    HCT 44.3 12/28/2010    MCV 94 08/21/2023    MCV 95 12/28/2010    MCH 31.6 08/21/2023    MCH 33.2 (H) 12/28/2010    MCHC 33.6 08/21/2023    MCHC 35.0 12/28/2010    RDW 11.8 08/21/2023    RDW 12.1 12/28/2010     08/21/2023     12/28/2010       BMP RESULTS:  Lab Results   Component Value Date     08/21/2023     12/28/2010    POTASSIUM 4.2 08/21/2023    POTASSIUM 3.8 12/28/2010    CHLORIDE 104 08/21/2023    CHLORIDE 102 12/28/2010    CO2 27 08/21/2023    CO2 27 12/28/2010    ANIONGAP 9 08/21/2023    ANIONGAP 10 12/28/2010    GLC 89 08/21/2023    GLC 96 12/28/2010    BUN 14.3 08/21/2023    BUN 10 12/28/2010    CR 0.62 08/21/2023    CR 0.65 12/28/2010    GFRESTIMATED >90 08/21/2023    GFRESTIMATED >90 12/28/2010    GFRESTBLACK >90 12/28/2010    DANYEL 9.7 08/21/2023    DANYEL 9.6 12/28/2010        A1C RESULTS:  No results found for: \"A1C\"    INR RESULTS:  No results found for: \"INR\"        CC  Referred Self, MD  No address on file  "

## 2024-12-16 NOTE — LETTER
12/16/2024    Yecenia Ricks MD  [address]  [place] [address]    RE: Layla Lazaro       Dear Colleague,     I had the pleasure of seeing Layla Lazaro in the [facility].  I had the pleasure of following up with Ms. Lazaro    She has coronary disease based on a high calcium score and high lipids we were able to review her stress echo today her exercise time was good no EKG changes and the echo was normal.  I believe she probably has a bicuspid valve with mild aortic valve stenosis in talking the family I told him to check indeed it turns out her mother actually had a bicuspid valve.  The children are going to be checked.  Also Dr. Ross raise the Crestor dose in her cholesterol numbers are now completely at goal.  Today we reviewed her blood pressure numbers are excellent reviewed the echo and the stress echo and the blood work from Dr. Min's office.  At this point I do not think she is come back for 2 to 3 years she get another echocardiogram to look at the aortic valve and aortic root size fortunately aortic root size is normal.  Today's visit was 30 minutes    No orders of the defined types were placed in this encounter.    Orders Placed This Encounter   Medications     alendronate (FOSAMAX) 70 MG tablet     Sig: Take 70 mg by mouth every 7 days.     There are no discontinued medications.      Encounter Diagnoses   Name Primary?     Coronary artery disease involving native coronary artery of native heart, unspecified whether angina present      Nonrheumatic aortic valve stenosis        CURRENT MEDICATIONS:  Current Outpatient Medications   Medication Sig Dispense Refill     alendronate (FOSAMAX) 70 MG tablet Take 70 mg by mouth every 7 days.       clobetasol (TEMOVATE) 0.05 % external ointment Apply topically as needed       rosuvastatin (CRESTOR) 40 MG tablet Take 40 mg by mouth daily       vitamin B-Complex Take 1 tablet by mouth Every 5 days        "Vitamin D3 (CHOLECALCIFEROL) 25 mcg (1000 units) tablet Take 25 mcg by mouth every morning         ALLERGIES   No Known Allergies    PAST MEDICAL HISTORY:  Past Medical History:   Diagnosis Date     Aortic valve stenosis     possible bicuspid     Colon polyps      Coronary artery disease involving native coronary artery without angina pectoris     based on CT calcium score     Hemochromatosis     gives blood every 3 mo     Hyperlipidemia LDL goal <70        PAST SURGICAL HISTORY:  Past Surgical History:   Procedure Laterality Date     ARTHROPLASTY KNEE UNICOMPARTMENT Right 3/14/2023    Procedure: Right knee unicompartmental arthroplasty;  Surgeon: Jose Juan Torrez MD;  Location: [facility] OR     ARTHROPLASTY KNEE UNICOMPARTMENT Left 2023    Procedure: Left unicompartment knee arthroplasty;  Surgeon: Jose Juan Torrez MD;  Location: [facility] OR     COLONOSCOPY       EYE SURGERY      as child     MOUTH SURGERY      wisdom teeth age 17       FAMILY HISTORY:  Family History   Problem Relation Age of Onset     Colon Cancer Father      No Known Problems Sister      Hyperlipidemia Other        SOCIAL HISTORY:  Social History     Socioeconomic History     Marital status:    Occupational History     Occupation: retired     Comment: human resources   Tobacco Use     Smoking status: Former     Current packs/day: 0.00     Types: Cigarettes     Quit date:      Years since quittin.9     Smokeless tobacco: Never   Substance and Sexual Activity     Alcohol use: Not Currently     Drug use: Never     Comment: caffeine -0       Review of Systems:  Skin:        Eyes:       ENT:       Respiratory:       Cardiovascular:       Gastroenterology:      Genitourinary:       Musculoskeletal:       Neurologic:       Psychiatric:       Heme/Lymph/Imm:       Endocrine:         Physical Exam:  Vitals: /76   Pulse 70   Temp 98  F (36.7  C)   Ht 1.626 m (5' 4\")   Wt 70.8 kg (156 lb)   LMP  (LMP Unknown)   SpO2 98%   BMI 26.78 " "kg/m        Constitutional:           Skin:             Head:           Eyes:           Lymph:      ENT:           Neck:           Respiratory:            Cardiac:                                                           GI:           Extremities and Muscular Skeletal:                 Neurological:           Psych:         Recent Lab Results:  LIPID RESULTS:  Lab Results   Component Value Date    TRIG 49 07/02/2024       LIVER ENZYME RESULTS:  No results found for: \"AST\", \"ALT\"    CBC RESULTS:  Lab Results   Component Value Date    WBC 7.1 08/21/2023    WBC 8.5 12/28/2010    RBC 4.68 08/21/2023    RBC 4.67 12/28/2010    HGB 14.8 08/21/2023    HGB 15.5 12/28/2010    HCT 44.1 08/21/2023    HCT 44.3 12/28/2010    MCV 94 08/21/2023    MCV 95 12/28/2010    MCH 31.6 08/21/2023    MCH 33.2 (H) 12/28/2010    MCHC 33.6 08/21/2023    MCHC 35.0 12/28/2010    RDW 11.8 08/21/2023    RDW 12.1 12/28/2010     08/21/2023     12/28/2010       BMP RESULTS:  Lab Results   Component Value Date     08/21/2023     12/28/2010    POTASSIUM 4.2 08/21/2023    POTASSIUM 3.8 12/28/2010    CHLORIDE 104 08/21/2023    CHLORIDE 102 12/28/2010    CO2 27 08/21/2023    CO2 27 12/28/2010    ANIONGAP 9 08/21/2023    ANIONGAP 10 12/28/2010    GLC 89 08/21/2023    GLC 96 12/28/2010    BUN 14.3 08/21/2023    BUN 10 12/28/2010    CR 0.62 08/21/2023    CR 0.65 12/28/2010    GFRESTIMATED >90 08/21/2023    GFRESTIMATED >90 12/28/2010    GFRESTBLACK >90 12/28/2010    DANYEL 9.7 08/21/2023    DANYEL 9.6 12/28/2010        A1C RESULTS:  No results found for: \"A1C\"    INR RESULTS:  No results found for: \"INR\"        CC  Referred Self, MD  No address on file      Thank you for allowing me to participate in the care of your patient.      Sincerely,     Nirav Shepard MD     [facility]  cc:   Referred Self, MD  No address on file      "

## 2025-04-03 ENCOUNTER — HOSPITAL ENCOUNTER (OUTPATIENT)
Dept: MAMMOGRAPHY | Facility: CLINIC | Age: 70
Discharge: HOME | End: 2025-04-03
Attending: FAMILY MEDICINE
Payer: COMMERCIAL

## 2025-04-03 DIAGNOSIS — Z12.31 VISIT FOR SCREENING MAMMOGRAM: ICD-10-CM

## 2025-04-03 PROCEDURE — 77067 SCR MAMMO BI INCL CAD: CPT

## 2025-04-03 PROCEDURE — 77063 BREAST TOMOSYNTHESIS BI: CPT

## 2025-06-14 ENCOUNTER — HEALTH MAINTENANCE LETTER (OUTPATIENT)
Age: 70
End: 2025-06-14

## (undated) DEVICE — HOOD FLYTE W/PEELAWAY 408-800-100

## (undated) DEVICE — BAG CLEAR TRASH 1.3M 39X33" P4040C

## (undated) DEVICE — SPONGE LAP 18X18" X8435

## (undated) DEVICE — SU VICRYL 1 MO-4 18" J702D

## (undated) DEVICE — BLADE SAW SAGITTAL STRK 13X90X1.27MM HD SYS 6 6113-127-090

## (undated) DEVICE — DECANTER BAG 2002S

## (undated) DEVICE — GLOVE BIOGEL PI SZ 7.5 40875

## (undated) DEVICE — BLADE SAW SAGITTAL STRK 25X90X1.27MM HD SYS 6 6125-127-090

## (undated) DEVICE — LINEN FULL SHEET 5511

## (undated) DEVICE — BNDG ELASTIC 6" DBL LENGTH UNSTERILE 6611-16

## (undated) DEVICE — PACK TOTAL KNEE BOXED LATEX FREE PO15TKFCT

## (undated) DEVICE — DRSG AQUACEL AG HYDROFIBER  3.5X10" 422605

## (undated) DEVICE — SOL NACL 0.9% IRRIG 3000ML BAG 2B7477

## (undated) DEVICE — SU VICRYL 2-0 CT-1 27" UND J259H

## (undated) DEVICE — GLOVE BIOGEL PI ULTRATOUCH SZ 7.5 41175

## (undated) DEVICE — Device

## (undated) DEVICE — TOURNIQUET SGL  BLADDER 30"X4" BLUE 5921030135

## (undated) DEVICE — BLADE SAW RECIP STRK 70X6X0.025MM 0277-096-250S5

## (undated) DEVICE — SU VICRYL 0 MO-4 CR 18" J701D

## (undated) DEVICE — BONE CEMENT MIXEVAC III HI VAC KIT  0206-015-000

## (undated) DEVICE — DRSG STERI STRIP 1/2X4" R1547

## (undated) DEVICE — SUCTION MANIFOLD NEPTUNE 2 SYS 4 PORT 0702-020-000

## (undated) DEVICE — SUCTION TIP YANKAUER W/O VENT K86

## (undated) DEVICE — PREP CHLORAPREP 26ML TINTED HI-LITE ORANGE 930815

## (undated) DEVICE — GLOVE BIOGEL PI MICRO INDICATOR UNDERGLOVE SZ 8.0 48980

## (undated) DEVICE — BLADE OXFORD PARTIAL KNEE STAB

## (undated) DEVICE — SOL NACL 0.9% IRRIG 1000ML BOTTLE 2F7124

## (undated) DEVICE — SET HANDPIECE INTERPULSE W/COAXIAL FAN SPRAY TIP 0210118000

## (undated) DEVICE — LINEN ORTHO ACL PACK 5447

## (undated) DEVICE — SU MONOCRYL 3-0 PS-1 27" Y936H

## (undated) DEVICE — DRAPE STERI U 1015

## (undated) DEVICE — MANIFOLD NEPTUNE 4 PORT 700-20

## (undated) RX ORDER — DEXAMETHASONE SODIUM PHOSPHATE 4 MG/ML
INJECTION, SOLUTION INTRA-ARTICULAR; INTRALESIONAL; INTRAMUSCULAR; INTRAVENOUS; SOFT TISSUE
Status: DISPENSED
Start: 2023-03-14

## (undated) RX ORDER — TRANEXAMIC ACID 650 MG/1
TABLET ORAL
Status: DISPENSED
Start: 2023-03-14

## (undated) RX ORDER — LIDOCAINE HYDROCHLORIDE 10 MG/ML
INJECTION, SOLUTION EPIDURAL; INFILTRATION; INTRACAUDAL; PERINEURAL
Status: DISPENSED
Start: 2023-09-19

## (undated) RX ORDER — FENTANYL CITRATE 50 UG/ML
INJECTION, SOLUTION INTRAMUSCULAR; INTRAVENOUS
Status: DISPENSED
Start: 2020-10-26

## (undated) RX ORDER — GLYCOPYRROLATE 0.2 MG/ML
INJECTION INTRAMUSCULAR; INTRAVENOUS
Status: DISPENSED
Start: 2023-03-14

## (undated) RX ORDER — METHADONE HYDROCHLORIDE 10 MG/ML
INJECTION, SOLUTION INTRAMUSCULAR; INTRAVENOUS; SUBCUTANEOUS
Status: DISPENSED
Start: 2023-03-14

## (undated) RX ORDER — CEFAZOLIN SODIUM 1 G/3ML
INJECTION, POWDER, FOR SOLUTION INTRAMUSCULAR; INTRAVENOUS
Status: DISPENSED
Start: 2023-09-19

## (undated) RX ORDER — CEFAZOLIN SODIUM/WATER 2 G/20 ML
SYRINGE (ML) INTRAVENOUS
Status: DISPENSED
Start: 2023-09-19

## (undated) RX ORDER — ONDANSETRON 2 MG/ML
INJECTION INTRAMUSCULAR; INTRAVENOUS
Status: DISPENSED
Start: 2023-03-14

## (undated) RX ORDER — TRANEXAMIC ACID 650 MG/1
TABLET ORAL
Status: DISPENSED
Start: 2023-09-19

## (undated) RX ORDER — FENTANYL CITRATE 50 UG/ML
INJECTION, SOLUTION INTRAMUSCULAR; INTRAVENOUS
Status: DISPENSED
Start: 2023-09-19

## (undated) RX ORDER — PROPOFOL 10 MG/ML
INJECTION, EMULSION INTRAVENOUS
Status: DISPENSED
Start: 2023-03-14

## (undated) RX ORDER — ONDANSETRON 2 MG/ML
INJECTION INTRAMUSCULAR; INTRAVENOUS
Status: DISPENSED
Start: 2023-09-19

## (undated) RX ORDER — CEFAZOLIN SODIUM/WATER 2 G/20 ML
SYRINGE (ML) INTRAVENOUS
Status: DISPENSED
Start: 2023-03-14

## (undated) RX ORDER — KETOROLAC TROMETHAMINE 15 MG/ML
INJECTION, SOLUTION INTRAMUSCULAR; INTRAVENOUS
Status: DISPENSED
Start: 2023-09-19

## (undated) RX ORDER — DEXAMETHASONE SODIUM PHOSPHATE 4 MG/ML
INJECTION, SOLUTION INTRA-ARTICULAR; INTRALESIONAL; INTRAMUSCULAR; INTRAVENOUS; SOFT TISSUE
Status: DISPENSED
Start: 2023-09-19

## (undated) RX ORDER — ACETAMINOPHEN 325 MG/1
TABLET ORAL
Status: DISPENSED
Start: 2023-09-19

## (undated) RX ORDER — PROPOFOL 10 MG/ML
INJECTION, EMULSION INTRAVENOUS
Status: DISPENSED
Start: 2023-09-19

## (undated) RX ORDER — LIDOCAINE HYDROCHLORIDE 10 MG/ML
INJECTION, SOLUTION EPIDURAL; INFILTRATION; INTRACAUDAL; PERINEURAL
Status: DISPENSED
Start: 2023-03-14

## (undated) RX ORDER — GLYCOPYRROLATE 0.2 MG/ML
INJECTION INTRAMUSCULAR; INTRAVENOUS
Status: DISPENSED
Start: 2023-09-19